# Patient Record
Sex: MALE | Race: WHITE | NOT HISPANIC OR LATINO | Employment: OTHER | URBAN - METROPOLITAN AREA
[De-identification: names, ages, dates, MRNs, and addresses within clinical notes are randomized per-mention and may not be internally consistent; named-entity substitution may affect disease eponyms.]

---

## 2018-02-22 ENCOUNTER — OFFICE VISIT (OUTPATIENT)
Dept: URGENT CARE | Facility: CLINIC | Age: 58
End: 2018-02-22
Payer: COMMERCIAL

## 2018-02-22 VITALS
BODY MASS INDEX: 34.46 KG/M2 | HEART RATE: 85 BPM | DIASTOLIC BLOOD PRESSURE: 98 MMHG | OXYGEN SATURATION: 99 % | TEMPERATURE: 97.5 F | WEIGHT: 260 LBS | SYSTOLIC BLOOD PRESSURE: 130 MMHG | RESPIRATION RATE: 16 BRPM | HEIGHT: 73 IN

## 2018-02-22 DIAGNOSIS — S05.02XA ABRASION OF LEFT CORNEA, INITIAL ENCOUNTER: ICD-10-CM

## 2018-02-22 PROCEDURE — 99203 OFFICE O/P NEW LOW 30 MIN: CPT | Performed by: PHYSICIAN ASSISTANT

## 2018-02-22 RX ORDER — MOXIFLOXACIN 5 MG/ML
1 SOLUTION/ DROPS OPHTHALMIC 3 TIMES DAILY
Qty: 1 BOTTLE | Refills: 0 | Status: SHIPPED | OUTPATIENT
Start: 2018-02-22 | End: 2018-03-01

## 2018-02-22 ASSESSMENT — ENCOUNTER SYMPTOMS
COUGH: 0
DOUBLE VISION: 0
HEADACHES: 0
EYE REDNESS: 1
PALPITATIONS: 0
EYE PAIN: 1
SORE THROAT: 0
CHILLS: 0
PHOTOPHOBIA: 0
SHORTNESS OF BREATH: 0
EYE DISCHARGE: 0
FOREIGN BODY SENSATION: 1
FEVER: 0
BLURRED VISION: 0

## 2018-02-22 ASSESSMENT — PATIENT HEALTH QUESTIONNAIRE - PHQ9: CLINICAL INTERPRETATION OF PHQ2 SCORE: 0

## 2018-02-22 NOTE — PROGRESS NOTES
"Subjective:      Martir Hoyos Jr. is a 57 y.o. male who presents with Eye Problem (left eye, grainy feeling inside the eye, was moving some ventalation yesterday, red and white part was swollen )            Eye Problem    The left eye is affected. This is a new problem. The current episode started yesterday. The problem occurs constantly. The problem has been unchanged. The injury mechanism was a foreign body. The pain is mild. There is no known exposure to pink eye. He does not wear contacts. Associated symptoms include eye redness and a foreign body sensation. Pertinent negatives include no blurred vision, eye discharge, double vision, fever or photophobia. He has tried commercial eye wash for the symptoms. The treatment provided mild relief.       Review of Systems   Constitutional: Negative for chills, fever and malaise/fatigue.   HENT: Negative for congestion, ear pain and sore throat.    Eyes: Positive for pain and redness. Negative for blurred vision, double vision, photophobia and discharge.   Respiratory: Negative for cough and shortness of breath.    Cardiovascular: Negative for chest pain and palpitations.   Skin: Negative for rash.   Neurological: Negative for headaches.   All other systems reviewed and are negative.    PMH:  has no past medical history on file.  MEDS:   Current Outpatient Prescriptions:   •  moxifloxacin (VIGAMOX) 0.5 % Solution, Place 1 Drop in both eyes 3 times a day for 7 days., Disp: 1 Bottle, Rfl: 0  ALLERGIES: Not on File  SURGHX: History reviewed. No pertinent surgical history.  SOCHX:  reports that he has never smoked. He has never used smokeless tobacco. He reports that he drinks alcohol. He reports that he does not use drugs.  FH: Family history was reviewed, no pertinent findings to report  Medications, Allergies, and current problem list reviewed today in Epic       Objective:     /98   Pulse 85   Temp 36.4 °C (97.5 °F)   Resp 16   Ht 1.854 m (6' 1\")   Wt " 117.9 kg (260 lb)   SpO2 99%   BMI 34.30 kg/m²      Physical Exam   Constitutional: He is oriented to person, place, and time. He appears well-developed and well-nourished. He is active.  Non-toxic appearance. He does not have a sickly appearance. He does not appear ill. No distress.   HENT:   Head: Normocephalic and atraumatic.   Right Ear: Hearing, tympanic membrane, external ear and ear canal normal.   Left Ear: Hearing, tympanic membrane, external ear and ear canal normal.   Nose: Nose normal.   Mouth/Throat: Uvula is midline, oropharynx is clear and moist and mucous membranes are normal. No oropharyngeal exudate.   Eyes: EOM and lids are normal. Pupils are equal, round, and reactive to light. Lids are everted and swept, no foreign bodies found. Left conjunctiva is injected.       Neck: Normal range of motion and full passive range of motion without pain. Neck supple.   Cardiovascular: Normal rate, regular rhythm and normal heart sounds.  Exam reveals no gallop and no friction rub.    No murmur heard.  Pulmonary/Chest: Effort normal and breath sounds normal. No respiratory distress. He has no decreased breath sounds. He has no wheezes. He has no rales. He exhibits no tenderness.   Musculoskeletal: Normal range of motion. He exhibits no edema, tenderness or deformity.   Neurological: He is alert and oriented to person, place, and time.   Skin: Skin is warm, dry and intact.   Psychiatric: He has a normal mood and affect. His speech is normal and behavior is normal. Judgment and thought content normal.   Vitals reviewed.              Assessment/Plan:     1. Abrasion of left cornea, initial encounter    - moxifloxacin (VIGAMOX) 0.5 % Solution; Place 1 Drop in both eyes 3 times a day for 7 days.  Dispense: 1 Bottle; Refill: 0    Differential diagnosis, natural history, supportive care discussed. Follow-up with primary care provider within 7-10 days, emergency room precautions discussed.  Patient and/or family  appears understanding of information.

## 2018-02-22 NOTE — PATIENT INSTRUCTIONS
Corneal Abrasion  The cornea is the clear covering at the front and center of the eye. When looking at the colored portion of the eye (iris), you are looking through the cornea. This very thin tissue is made up of many layers. The surface layer is a single layer of cells (corneal epithelium) and is one of the most sensitive tissues in the body. If a scratch or injury causes the corneal epithelium to come off, it is called a corneal abrasion. If the injury extends to the tissues below the epithelium, the condition is called a corneal ulcer.  CAUSES   · Scratches.  · Trauma.  · Foreign body in the eye.  Some people have recurrences of abrasions in the area of the original injury even after it has healed (recurrent erosion syndrome). Recurrent erosion syndrome generally improves and goes away with time.  SYMPTOMS   · Eye pain.  · Difficulty or inability to keep the injured eye open.  · The eye becomes very sensitive to light.  · Recurrent erosions tend to happen suddenly, first thing in the morning, usually after waking up and opening the eye.  DIAGNOSIS   Your health care provider can diagnose a corneal abrasion during an eye exam. Dye is usually placed in the eye using a drop or a small paper strip moistened by your tears. When the eye is examined with a special light, the abrasion shows up clearly because of the dye.  TREATMENT   · Small abrasions may be treated with antibiotic drops or ointment alone.  · A pressure patch may be put over the eye. If this is done, follow your doctor's instructions for when to remove the patch. Do not drive or use machines while the eye patch is on. Judging distances is hard to do with a patch on.  If the abrasion becomes infected and spreads to the deeper tissues of the cornea, a corneal ulcer can result. This is serious because it can cause corneal scarring. Corneal scars interfere with light passing through the cornea and cause a loss of vision in the involved eye.  HOME CARE  INSTRUCTIONS  · Use medicine or ointment as directed. Only take over-the-counter or prescription medicines for pain, discomfort, or fever as directed by your health care provider.  · Do not drive or operate machinery if your eye is patched. Your ability to  distances is impaired.  · If your health care provider has given you a follow-up appointment, it is very important to keep that appointment. Not keeping the appointment could result in a severe eye infection or permanent loss of vision. If there is any problem keeping the appointment, let your health care provider know.  SEEK MEDICAL CARE IF:   · You have pain, light sensitivity, and a scratchy feeling in one eye or both eyes.  · Your pressure patch keeps loosening up, and you can blink your eye under the patch after treatment.  · Any kind of discharge develops from the eye after treatment or if the lids stick together in the morning.  · You have the same symptoms in the morning as you did with the original abrasion days, weeks, or months after the abrasion healed.  MAKE SURE YOU:   · Understand these instructions.  · Will watch your condition.  · Will get help right away if you are not doing well or get worse.     This information is not intended to replace advice given to you by your health care provider. Make sure you discuss any questions you have with your health care provider.     Document Released: 12/15/2001 Document Revised: 12/23/2014 Document Reviewed: 08/25/2014  [employer] Interactive Patient Education ©2016 [employer]

## 2018-03-15 ENCOUNTER — HOSPITAL ENCOUNTER (OUTPATIENT)
Dept: RADIOLOGY | Facility: MEDICAL CENTER | Age: 58
End: 2018-03-15
Attending: FAMILY MEDICINE
Payer: COMMERCIAL

## 2018-03-15 DIAGNOSIS — M25.559 ARTHRALGIA OF HIP, UNSPECIFIED LATERALITY: ICD-10-CM

## 2018-03-15 PROCEDURE — 72100 X-RAY EXAM L-S SPINE 2/3 VWS: CPT

## 2023-10-23 PROBLEM — M16.11 OSTEOARTHRITIS OF RIGHT HIP: Status: ACTIVE | Noted: 2023-10-23

## 2025-02-19 ENCOUNTER — TELEPHONE (OUTPATIENT)
Dept: VASCULAR SURGERY | Facility: MEDICAL CENTER | Age: 65
End: 2025-02-19

## 2025-02-21 NOTE — PROGRESS NOTES
REFERRING PHYSICIAN: Charles Lawler MD.     CONSULTING PHYSICIAN: Bertram Buckley DO     CHIEF COMPLAINT: Fatigue    HISTORY OF PRESENT ILLNESS: The patient is a 64 y.o. male with past medical history of coronary artery disease, hyperlipidemia, hypertension, and BPH who presents to clinic. He complains of shortness of breath and fatigue. He denies chest pain, orthopnea, PND, dizziness, and syncope. He walks daily.     PAST MEDICAL HISTORY:   Active Ambulatory Problems     Diagnosis Date Noted    Osteoarthritis of right hip 10/23/2023     Resolved Ambulatory Problems     Diagnosis Date Noted    No Resolved Ambulatory Problems     No Additional Past Medical History       PAST SURGICAL HISTORY:   Past Surgical History:   Procedure Laterality Date    VT TOTAL HIP ARTHROPLASTY Right 12/19/2023    Procedure: RIGHT TOTAL HIP ARTHROPLASTY ANTERIOR APPROACH;  Surgeon: Pancho Castro M.D.;  Location: [facility];  Service: Orthopedics        ALLERGIES: Not on File     CURRENT MEDICATIONS:   Current Outpatient Medications:     celecoxib (CELEBREX) 200 MG Cap, TAKE 1 CAPSULE WITH FOOD ORALLY ONCE A DAY AS NEEDED 30 DAYS, Disp: , Rfl:     hydroCHLOROthiazide 25 MG Tab, Take 25 mg by mouth every day., Disp: , Rfl:     irbesartan (AVAPRO) 300 MG Tab, Take 1 Tablet by mouth every day., Disp: , Rfl:     metoprolol SR (TOPROL XL) 25 MG TABLET SR 24 HR, Take 25 mg by mouth every day. FOR 30 DAYS, Disp: , Rfl:     tamsulosin (FLOMAX) 0.4 MG capsule, TAKE 2 CAPSULES BY MOUTH EVERY DAY IN THE MORNING 90 30, Disp: , Rfl:     rosuvastatin (CRESTOR) 20 MG Tab, Take 20 mg by mouth every evening., Disp: , Rfl:     FAMILY HISTORY: No family history on file.     SOCIAL HISTORY:   Social History     Socioeconomic History    Marital status:      Spouse name: Not on file    Number of children: Not on file    Years of education: Not on file    Highest education level: Not on file   Occupational History     Not on file   Tobacco Use    Smoking status: Never    Smokeless tobacco: Never   Substance and Sexual Activity    Alcohol use: Yes     Comment: social    Drug use: No    Sexual activity: Not on file   Other Topics Concern    Not on file   Social History Narrative    Not on file     Social Drivers of Health     Financial Resource Strain: Not on file   Food Insecurity: Not on file   Transportation Needs: Not on file   Physical Activity: Not on file   Stress: Not on file   Social Connections: Not on file   Intimate Partner Violence: Not on file   Housing Stability: Not on file       REVIEW OF SYSTEMS:  Review of Systems   Constitutional:  Positive for malaise/fatigue. Negative for chills, diaphoresis, fever and weight loss.   HENT:  Negative for congestion, ear pain, hearing loss, nosebleeds, sore throat and tinnitus.    Eyes:  Negative for blurred vision, double vision, pain and discharge.   Respiratory:  Positive for shortness of breath. Negative for cough, hemoptysis, sputum production, wheezing and stridor.    Cardiovascular:  Negative for chest pain, palpitations, orthopnea and leg swelling.   Gastrointestinal:  Negative for abdominal pain, constipation, heartburn, melena, nausea and vomiting.   Genitourinary:  Negative for dysuria, flank pain and hematuria.   Musculoskeletal:  Negative for joint pain, myalgias and neck pain.   Skin:  Negative for itching and rash.   Neurological:  Negative for dizziness, speech change, focal weakness, seizures, weakness and headaches.   Endo/Heme/Allergies:  Negative for environmental allergies and polydipsia. Does not bruise/bleed easily.   Psychiatric/Behavioral:  Negative for depression, hallucinations, substance abuse and suicidal ideas.          PHYSICAL EXAMINATION:    /70 (BP Location: Left arm, Patient Position: Sitting, BP Cuff Size: Adult)   Pulse 67   Temp 36.5 °C (97.7 °F) (Temporal)   SpO2 97%      Physical Exam  Constitutional:       General: He is not  "in acute distress.  HENT:      Head: Normocephalic and atraumatic.      Nose: Nose normal.   Eyes:      Conjunctiva/sclera: Conjunctivae normal.      Pupils: Pupils are equal, round, and reactive to light.   Neck:      Vascular: No JVD.      Trachea: No tracheal deviation.   Cardiovascular:      Rate and Rhythm: Normal rate and regular rhythm.      Heart sounds: No murmur heard.  Pulmonary:      Effort: Pulmonary effort is normal. No respiratory distress.      Breath sounds: Normal breath sounds. No stridor.   Abdominal:      General: Bowel sounds are normal. There is no distension.      Palpations: Abdomen is soft.      Tenderness: There is no abdominal tenderness.   Musculoskeletal:         General: No tenderness. Normal range of motion.      Cervical back: Normal range of motion and neck supple.   Skin:     General: Skin is warm and dry.   Neurological:      Mental Status: He is alert and oriented to person, place, and time.      Coordination: Coordination normal.      Gait: Gait is intact.   Psychiatric:         Mood and Affect: Mood and affect normal.         Cognition and Memory: Memory normal.         LABS REVIEWED:  Lab Results   Component Value Date/Time    SODIUM 139 12/12/2024 10:14 AM    POTASSIUM 3.9 12/12/2024 10:14 AM    CHLORIDE 107 12/12/2024 10:14 AM    CO2 22.0 12/12/2024 10:14 AM    GLUCOSE 107 12/12/2024 10:14 AM    BUN 19.0 12/12/2024 10:14 AM    CREATININE 0.90 12/12/2024 10:14 AM    BUNCREATRAT 21.1 (H) 12/12/2024 10:14 AM      No results found for: \"PROTHROMBTM\", \"INR\"   Lab Results   Component Value Date/Time    WBC 7.10 12/12/2024 10:14 AM    RBC 5.26 12/12/2024 10:14 AM    HEMOGLOBIN 16.1 12/12/2024 10:14 AM    HEMATOCRIT 47.0 12/12/2024 10:14 AM    MCV 89.4 12/12/2024 10:14 AM    MCH 30.7 12/12/2024 10:14 AM    MCHC 34.4 12/12/2024 10:14 AM    MPV 9.0 12/12/2024 10:14 AM    NEUTSPOLYS 72.3 12/12/2024 10:14 AM    LYMPHOCYTES 18.6 (L) 12/12/2024 10:14 AM    MONOCYTES 6.5 12/12/2024 10:14 " AM    EOSINOPHILS 1.6 12/12/2024 10:14 AM    BASOPHILS 1.0 12/12/2024 10:14 AM        IMAGING REVIEWED AND INTERPRETED:    ECHOCARDIOGRAM   11/27/24 [facility]  Impression  Normal left ventricular systolic function. The left ventruclar ejection fraction is visually estimated to be 65%  The right ventricle is normal in size and systolic function  No significant valvular abnormalities.     CARDIAC CATHETERIZATION   12/12/24 [facility]  Impression     1. 100% occluded proximal LAD with reconstitution in the distal portion from left-to-left collateralization from a distal circumflex artery     2. Nonobstructive disease seen in the circumflex artery and obtuse marginal artery moderate plaque formation noted     3. Proximal RCA disease in the range of 35-40%         IMPRESSION:  Occlusion of LAD, HTN, HLD      PLAN:  I recommend CABG x 1.     The procedure, its risks, benefits, potential complications and alternative treatments were discussed with the patient in detail including the risks should he decide not to undergo my recommended treatment. All of his questions were answered to his satisfaction and he is willing to proceed with the operation. The risks include death, stroke, infection: to include a rare bacterial infection related to the use of the heart/lung machine, uma-operative myocardial infarction, dysrhythmias, diaphragmatic paralysis, chest wall paresthesia, tracheostomy, kidney or other organ failure, possible return to the operating room for bleeding, bleeding requiring transfusion with its attendant risks including AIDS or hepatitis, dehiscence of surgical incisions, respiratory complications including the need for prolonged ventilator support, Protamine or other drug reaction, peripheral neuropathy, loss of limb, and miscount of surgical items. The operative mortality risk is approximately 0.5%. The STS mortality risk score is 0.2% and the morbidity and mortality risk score is 4%. The scores were discussed with  patient.    Thank you for this very challenging consultation and participation in the patient’s care.  I will keep you apprised of all future developments.      The operation will be scheduled once the patient and his family make arrangements to their respective schedules.     Sincerely,     Bertram Buckley DO.

## 2025-02-24 ENCOUNTER — HOSPITAL ENCOUNTER (OUTPATIENT)
Dept: RADIOLOGY | Facility: MEDICAL CENTER | Age: 65
End: 2025-02-24

## 2025-02-24 ENCOUNTER — HOSPITAL ENCOUNTER (OUTPATIENT)
Dept: RADIOLOGY | Facility: MEDICAL CENTER | Age: 65
End: 2025-02-24
Attending: INTERNAL MEDICINE

## 2025-03-06 ENCOUNTER — OFFICE VISIT (OUTPATIENT)
Facility: MEDICAL CENTER | Age: 65
End: 2025-03-06
Payer: COMMERCIAL

## 2025-03-06 VITALS
HEART RATE: 67 BPM | OXYGEN SATURATION: 97 % | SYSTOLIC BLOOD PRESSURE: 102 MMHG | DIASTOLIC BLOOD PRESSURE: 70 MMHG | TEMPERATURE: 97.7 F

## 2025-03-06 DIAGNOSIS — I25.84 CORONARY ARTERY DISEASE DUE TO CALCIFIED CORONARY LESION: ICD-10-CM

## 2025-03-06 DIAGNOSIS — I25.10 CORONARY ARTERY DISEASE DUE TO CALCIFIED CORONARY LESION: ICD-10-CM

## 2025-03-06 PROCEDURE — 99205 OFFICE O/P NEW HI 60 MIN: CPT | Performed by: THORACIC SURGERY (CARDIOTHORACIC VASCULAR SURGERY)

## 2025-03-06 PROCEDURE — 3074F SYST BP LT 130 MM HG: CPT | Performed by: THORACIC SURGERY (CARDIOTHORACIC VASCULAR SURGERY)

## 2025-03-06 PROCEDURE — 3078F DIAST BP <80 MM HG: CPT | Performed by: THORACIC SURGERY (CARDIOTHORACIC VASCULAR SURGERY)

## 2025-03-06 RX ORDER — METOPROLOL SUCCINATE 25 MG/1
25 TABLET, EXTENDED RELEASE ORAL
COMMUNITY

## 2025-03-06 RX ORDER — HYDROCHLOROTHIAZIDE 25 MG/1
25 TABLET ORAL DAILY
COMMUNITY
Start: 2023-07-01

## 2025-03-06 RX ORDER — ROSUVASTATIN CALCIUM 40 MG/1
40 TABLET, COATED ORAL DAILY
COMMUNITY
Start: 2023-12-08 | End: 2024-03-07

## 2025-03-06 RX ORDER — ROSUVASTATIN CALCIUM 20 MG/1
20 TABLET, COATED ORAL EVERY EVENING
COMMUNITY

## 2025-03-06 RX ORDER — IRBESARTAN 300 MG/1
1 TABLET ORAL DAILY
COMMUNITY
Start: 2023-07-01

## 2025-03-06 RX ORDER — TAMSULOSIN HYDROCHLORIDE 0.4 MG/1
CAPSULE ORAL NIGHTLY
COMMUNITY

## 2025-03-06 RX ORDER — CELECOXIB 200 MG/1
CAPSULE ORAL
COMMUNITY

## 2025-03-06 ASSESSMENT — ENCOUNTER SYMPTOMS
HALLUCINATIONS: 0
DEPRESSION: 0
HEADACHES: 0
POLYDIPSIA: 0
EYE PAIN: 0
BRUISES/BLEEDS EASILY: 0
VOMITING: 0
BLURRED VISION: 0
NAUSEA: 0
DIAPHORESIS: 0
SEIZURES: 0
HEARTBURN: 0
SPUTUM PRODUCTION: 0
ABDOMINAL PAIN: 0
CHILLS: 0
HEMOPTYSIS: 0
COUGH: 0
STRIDOR: 0
DOUBLE VISION: 0
ORTHOPNEA: 0
SPEECH CHANGE: 0
WHEEZING: 0
DIZZINESS: 0
NECK PAIN: 0
FLANK PAIN: 0
FEVER: 0
EYE DISCHARGE: 0
FOCAL WEAKNESS: 0
CONSTIPATION: 0
WEIGHT LOSS: 0
WEAKNESS: 0
PALPITATIONS: 0
SORE THROAT: 0
SHORTNESS OF BREATH: 1
MYALGIAS: 0

## 2025-03-06 ASSESSMENT — LIFESTYLE VARIABLES: SUBSTANCE_ABUSE: 0

## 2025-03-10 ENCOUNTER — HOSPITAL ENCOUNTER (OUTPATIENT)
Dept: RADIOLOGY | Facility: MEDICAL CENTER | Age: 65
End: 2025-03-10
Attending: NURSE PRACTITIONER
Payer: COMMERCIAL

## 2025-03-10 DIAGNOSIS — I25.84 CORONARY ARTERY DISEASE DUE TO CALCIFIED CORONARY LESION: ICD-10-CM

## 2025-03-10 DIAGNOSIS — I25.10 CORONARY ARTERY DISEASE DUE TO CALCIFIED CORONARY LESION: ICD-10-CM

## 2025-03-10 PROCEDURE — 93970 EXTREMITY STUDY: CPT

## 2025-03-10 PROCEDURE — 93880 EXTRACRANIAL BILAT STUDY: CPT

## 2025-03-11 NOTE — PROGRESS NOTES
Problem: Prehabilitation    Goal: optimize identified modifiable risk factors prior to cardiac surgery.     Intervention: Screening and interventions for the following  risk factors with educational materials provided if indicated  and patient demonstrates readiness to participate.  Dentition, malnutrition, CAD and dietary cholesterol,  obesity, alcohol and tobacco abuse, illegal drug use,  recent eye surgery/procedures, A1C > 7.5 for pharmacotherapy referral,  and social support system for post discharge planning.    Additionally, home exercise regimen initiation or continuation  (if appropriate), pre surgery weight restriction for aneurysm patients,  and teaching of and participation of inspiratory muscle training   via incentive spirometer (provided).    Review of post-surgical physical limitations, upcoming  appointments & testing, ordered diagnostics, and medication review  to identify anticoagulants, antihypertensives, HF and DM medications  that need cessation prior to surgery.    The surgery instructions sheet,MAP, and education booklet   was provided for patient to read and review.    Surgical CHG wipes were also provided with instructions on use.    DENTITION:  Routine dental appointment prior to surgery  Is not indicated for CABG procedure.    he was not encouraged to get a dental   cleaning as he does get regular  dental cleaning and denies current dental   infection or issues that should be  addressed prior to surgery.    RECENT EYE SURGERY:   Patient denies recent eye surgery or procedures in in the last  6 months, surgeon was not notified.    INCENTIVE SPIROMETRY:  Discussed importance of incentive spirometry (IS) use,  20 times a day AT MINIMUM but more often if possible to  optimize cardio-pulmonary function prior to surgery.  They were instructed to allow a 5 minute rest in  between uses to achieve max IS volume.  Education with return demonstration performed.   Coaching was provided, patient is  effective.    Prehabilitation IS baseline is 4250.    HOME EXERCISE REGIMEN:  We discussed importance of preventing deconditioning and  muscle wasting in the time preceding surgery as this will occur  post surgery.    > 60 % left main disease present? NO  EF-- 65%  Active sub lingual nitro use? NO  he is appropriate for initiation  of a home exercise regimen.    he is minimally physically active; current  exercise tolerance/level is moderate due to no  symptoms with current 15 min of walking activity.    he was educated to increase his his/her current exercise regimen of walking 15 min daily to 30 minutes daily.     he was educated  that if chest pain or SOB occurs patient is to stop  immediately and if symptoms do not  resolve after stopping to call 911.    he was also encouraged to practice sit to stand  from a chair with one arm to assist or no arm assistance  12 times a day to strengthen quadriceps, improve balance,  and to mentally prepare for this restriction    Patient is not an aneurysm patient and a weight restriction is not indicated.    CAD:  Patient does have known CAD; education on  cholesterol, diet, and the heart are indicated.    OBESITY:  Patient's BMI is over 30.  Education regarding portion  Sizing and diet are indicated.    MALNUTRITION:  Malnutrition screening tool (MST) shows he is  not at risk with a score of 0.  (0-1 not at risk; greater or equal to 2 is at risk).    Given MST score, functional capacity,   and no reported muscle loss, it was not  recommended they increase their protein  intake to 1.2 to 2.5 gram/kg/day.    SMOKING:  Patient denies current smoking history.  Smoking risks  and cessation education not indicated and was not provided.    ALCOHOL ABUSE:  Patient denies alcohol abuse.  Alcohol risks and cessation  education not indicated and was not provided.    ILLEGAL DRUG USE:  Patient denies illicit drug use.  Illicit drug use risks  and cessation education not indicated and was  not provided.    SOCIAL SUPPORT SYSTEM FOR DISCHARGE NEEDS:    Patient does have family,  his wife Fifi to stay for  2 days post discharge to assist with ADL's. No barriers  to hospital discharge anticipated.    PSYCHOLOGICAL PREPARATION:  We discussed the basics of physical limitation post op to include:               No driving for 4 weeks               No lifting, pushing or pulling > 10 lbs for 6 weeks  Sternal precautions to include moving  Within the tube and safe mobility in and  out of bed and the chair.    ANTIBIOTIC STEWARDSHIP:  MRSA swab will be collected by Anish during pre-admit testing.    MEDICATION AN UPCOMING APPOINTMENTS REVIEW:  Current medications were reviewed that may need  specific stop dates prior to surgery. The patient was instructed   to stop the following medications after the indicated date.    Irebartan to stop 2 full days prior to surgery; last dose 4/5    Vascular scheduling sheet was provided and explained.    Walk test Times: unable to collect    A phone appointment for pre op education was made  for 4/4 between 7-9 am to review all provided education materials.'

## 2025-03-13 ENCOUNTER — APPOINTMENT (OUTPATIENT)
Dept: ADMISSIONS | Facility: MEDICAL CENTER | Age: 65
End: 2025-03-13
Attending: THORACIC SURGERY (CARDIOTHORACIC VASCULAR SURGERY)
Payer: COMMERCIAL

## 2025-03-20 ENCOUNTER — PRE-ADMISSION TESTING (OUTPATIENT)
Dept: ADMISSIONS | Facility: MEDICAL CENTER | Age: 65
End: 2025-03-20
Attending: THORACIC SURGERY (CARDIOTHORACIC VASCULAR SURGERY)
Payer: COMMERCIAL

## 2025-03-20 RX ORDER — SODIUM PHOSPHATE,MONO-DIBASIC 19G-7G/118
500 ENEMA (ML) RECTAL 2 TIMES DAILY
Status: ON HOLD | COMMUNITY
End: 2025-04-12

## 2025-03-20 NOTE — PREADMIT AVS NOTE
Current Medications                                          Medication instructions per cardiology

## 2025-04-04 ENCOUNTER — TELEPHONE (OUTPATIENT)
Facility: MEDICAL CENTER | Age: 65
End: 2025-04-04
Payer: COMMERCIAL

## 2025-04-04 NOTE — TELEPHONE ENCOUNTER
Patient was reminded that CABG surgery with  Dr. Del Rosario is on 4/8 at 07:30 in the morning. he  is are aware check in is at 05:15 am.    PAT date and time was reviewed with patient and  verified it is within 72 hours of surgery.    Vascular studies and procedures: Carotids, vein mapping  were scheduled, completed,  and reviewed by myself for concerning  results did not need escalation to the TOMAS/MD.    he did not need a dental check/work and was compliant.    Baseline IS was 4250. he has been compliant   with the IS. Volume has improved.    he was prescribed a walking regimen or  told to continue he current regimen and   he has been compliant.   he has been practicing sit to stand.    he was reminded that Avapro needs to stop after 4/5.    he was told that Flomax medication (s) are okay to  take the morning of surgery prior to check in.    The [facility] wipes instructions were reviewed and understood.    he was reminded no food after midnight.    Call time 10 minutes

## 2025-04-07 ENCOUNTER — PRE-ADMISSION TESTING (OUTPATIENT)
Dept: ADMISSIONS | Facility: MEDICAL CENTER | Age: 65
DRG: 236 | End: 2025-04-07
Attending: THORACIC SURGERY (CARDIOTHORACIC VASCULAR SURGERY)
Payer: COMMERCIAL

## 2025-04-07 ENCOUNTER — HOSPITAL ENCOUNTER (OUTPATIENT)
Dept: RADIOLOGY | Facility: MEDICAL CENTER | Age: 65
DRG: 236 | End: 2025-04-07
Attending: THORACIC SURGERY (CARDIOTHORACIC VASCULAR SURGERY) | Admitting: THORACIC SURGERY (CARDIOTHORACIC VASCULAR SURGERY)
Payer: COMMERCIAL

## 2025-04-07 DIAGNOSIS — Z01.810 PRE-OPERATIVE CARDIOVASCULAR EXAMINATION: ICD-10-CM

## 2025-04-07 DIAGNOSIS — Z01.811 PRE-OPERATIVE RESPIRATORY EXAMINATION: ICD-10-CM

## 2025-04-07 DIAGNOSIS — Z01.812 PRE-OPERATIVE LABORATORY EXAMINATION: ICD-10-CM

## 2025-04-07 LAB
ABO GROUP BLD: NORMAL
ALBUMIN SERPL BCP-MCNC: 4.1 G/DL (ref 3.2–4.9)
ALBUMIN/GLOB SERPL: 1.5 G/DL
ALP SERPL-CCNC: 66 U/L (ref 30–99)
ALT SERPL-CCNC: 48 U/L (ref 2–50)
AMPHET UR QL SCN: NEGATIVE
ANION GAP SERPL CALC-SCNC: 10 MMOL/L (ref 7–16)
APPEARANCE UR: CLEAR
APTT PPP: 26 SEC (ref 24.7–36)
AST SERPL-CCNC: 36 U/L (ref 12–45)
BARBITURATES UR QL SCN: NEGATIVE
BASOPHILS # BLD AUTO: 1 % (ref 0–1.8)
BASOPHILS # BLD: 0.06 K/UL (ref 0–0.12)
BENZODIAZ UR QL SCN: NEGATIVE
BILIRUB SERPL-MCNC: 0.6 MG/DL (ref 0.1–1.5)
BILIRUB UR QL STRIP.AUTO: NEGATIVE
BLD GP AB SCN SERPL QL: NORMAL
BUN SERPL-MCNC: 14 MG/DL (ref 8–22)
BZE UR QL SCN: NEGATIVE
CALCIUM ALBUM COR SERPL-MCNC: 8.9 MG/DL (ref 8.5–10.5)
CALCIUM SERPL-MCNC: 9 MG/DL (ref 8.5–10.5)
CANNABINOIDS UR QL SCN: NEGATIVE
CHLORIDE SERPL-SCNC: 105 MMOL/L (ref 96–112)
CO2 SERPL-SCNC: 22 MMOL/L (ref 20–33)
COLOR UR: YELLOW
CREAT SERPL-MCNC: 0.95 MG/DL (ref 0.5–1.4)
EKG IMPRESSION: NORMAL
EOSINOPHIL # BLD AUTO: 0.25 K/UL (ref 0–0.51)
EOSINOPHIL NFR BLD: 4 % (ref 0–6.9)
ERYTHROCYTE [DISTWIDTH] IN BLOOD BY AUTOMATED COUNT: 41.6 FL (ref 35.9–50)
EST. AVERAGE GLUCOSE BLD GHB EST-MCNC: 114 MG/DL
FENTANYL UR QL: NEGATIVE
GFR SERPLBLD CREATININE-BSD FMLA CKD-EPI: 90 ML/MIN/1.73 M 2
GLOBULIN SER CALC-MCNC: 2.8 G/DL (ref 1.9–3.5)
GLUCOSE SERPL-MCNC: 106 MG/DL (ref 65–99)
GLUCOSE UR STRIP.AUTO-MCNC: NEGATIVE MG/DL
HBA1C MFR BLD: 5.6 % (ref 4–5.6)
HCT VFR BLD AUTO: 47.7 % (ref 42–52)
HGB BLD-MCNC: 15.9 G/DL (ref 14–18)
IMM GRANULOCYTES # BLD AUTO: 0.01 K/UL (ref 0–0.11)
IMM GRANULOCYTES NFR BLD AUTO: 0.2 % (ref 0–0.9)
INR PPP: 0.99 (ref 0.87–1.13)
KETONES UR STRIP.AUTO-MCNC: NEGATIVE MG/DL
LEUKOCYTE ESTERASE UR QL STRIP.AUTO: NEGATIVE
LYMPHOCYTES # BLD AUTO: 1.44 K/UL (ref 1–4.8)
LYMPHOCYTES NFR BLD: 23.3 % (ref 22–41)
MCH RBC QN AUTO: 29.3 PG (ref 27–33)
MCHC RBC AUTO-ENTMCNC: 33.3 G/DL (ref 32.3–36.5)
MCV RBC AUTO: 87.8 FL (ref 81.4–97.8)
METHADONE UR QL SCN: NEGATIVE
MICRO URNS: NORMAL
MONOCYTES # BLD AUTO: 0.49 K/UL (ref 0–0.85)
MONOCYTES NFR BLD AUTO: 7.9 % (ref 0–13.4)
NEUTROPHILS # BLD AUTO: 3.93 K/UL (ref 1.82–7.42)
NEUTROPHILS NFR BLD: 63.6 % (ref 44–72)
NITRITE UR QL STRIP.AUTO: NEGATIVE
NRBC # BLD AUTO: 0 K/UL
NRBC BLD-RTO: 0 /100 WBC (ref 0–0.2)
OPIATES UR QL SCN: NEGATIVE
OXYCODONE UR QL SCN: NEGATIVE
PCP UR QL SCN: NEGATIVE
PH UR STRIP.AUTO: 6 [PH] (ref 5–8)
PLATELET # BLD AUTO: 284 K/UL (ref 164–446)
PMV BLD AUTO: 10.5 FL (ref 9–12.9)
POTASSIUM SERPL-SCNC: 4.4 MMOL/L (ref 3.6–5.5)
PROPOXYPH UR QL SCN: NEGATIVE
PROT SERPL-MCNC: 6.9 G/DL (ref 6–8.2)
PROT UR QL STRIP: NEGATIVE MG/DL
PROTHROMBIN TIME: 13.1 SEC (ref 12–14.6)
RBC # BLD AUTO: 5.43 M/UL (ref 4.7–6.1)
RBC UR QL AUTO: NEGATIVE
RH BLD: NORMAL
SCCMEC + MECA PNL NOSE NAA+PROBE: NEGATIVE
SCCMEC + MECA PNL NOSE NAA+PROBE: NEGATIVE
SODIUM SERPL-SCNC: 137 MMOL/L (ref 135–145)
SP GR UR STRIP.AUTO: 1.02
UROBILINOGEN UR STRIP.AUTO-MCNC: 1 EU/DL
WBC # BLD AUTO: 6.2 K/UL (ref 4.8–10.8)

## 2025-04-07 PROCEDURE — 80307 DRUG TEST PRSMV CHEM ANLYZR: CPT

## 2025-04-07 PROCEDURE — 85025 COMPLETE CBC W/AUTO DIFF WBC: CPT

## 2025-04-07 PROCEDURE — 81003 URINALYSIS AUTO W/O SCOPE: CPT

## 2025-04-07 PROCEDURE — 86850 RBC ANTIBODY SCREEN: CPT

## 2025-04-07 PROCEDURE — 87640 STAPH A DNA AMP PROBE: CPT

## 2025-04-07 PROCEDURE — 83036 HEMOGLOBIN GLYCOSYLATED A1C: CPT

## 2025-04-07 PROCEDURE — 87641 MR-STAPH DNA AMP PROBE: CPT

## 2025-04-07 PROCEDURE — 85730 THROMBOPLASTIN TIME PARTIAL: CPT

## 2025-04-07 PROCEDURE — 86900 BLOOD TYPING SEROLOGIC ABO: CPT

## 2025-04-07 PROCEDURE — 93010 ELECTROCARDIOGRAM REPORT: CPT | Performed by: INTERNAL MEDICINE

## 2025-04-07 PROCEDURE — 93005 ELECTROCARDIOGRAM TRACING: CPT | Mod: TC

## 2025-04-07 PROCEDURE — 80053 COMPREHEN METABOLIC PANEL: CPT

## 2025-04-07 PROCEDURE — 71046 X-RAY EXAM CHEST 2 VIEWS: CPT

## 2025-04-07 PROCEDURE — 86901 BLOOD TYPING SEROLOGIC RH(D): CPT

## 2025-04-07 PROCEDURE — 36415 COLL VENOUS BLD VENIPUNCTURE: CPT

## 2025-04-07 PROCEDURE — 85610 PROTHROMBIN TIME: CPT

## 2025-04-08 ENCOUNTER — PATIENT OUTREACH (OUTPATIENT)
Dept: SCHEDULING | Facility: IMAGING CENTER | Age: 65
End: 2025-04-08

## 2025-04-08 ENCOUNTER — HOSPITAL ENCOUNTER (INPATIENT)
Facility: MEDICAL CENTER | Age: 65
LOS: 4 days | DRG: 236 | End: 2025-04-12
Attending: THORACIC SURGERY (CARDIOTHORACIC VASCULAR SURGERY) | Admitting: THORACIC SURGERY (CARDIOTHORACIC VASCULAR SURGERY)
Payer: COMMERCIAL

## 2025-04-08 ENCOUNTER — APPOINTMENT (OUTPATIENT)
Dept: CARDIOLOGY | Facility: MEDICAL CENTER | Age: 65
DRG: 236 | End: 2025-04-08
Attending: THORACIC SURGERY (CARDIOTHORACIC VASCULAR SURGERY)
Payer: COMMERCIAL

## 2025-04-08 ENCOUNTER — ANESTHESIA EVENT (OUTPATIENT)
Dept: SURGERY | Facility: MEDICAL CENTER | Age: 65
DRG: 236 | End: 2025-04-08
Payer: COMMERCIAL

## 2025-04-08 ENCOUNTER — APPOINTMENT (OUTPATIENT)
Dept: RADIOLOGY | Facility: MEDICAL CENTER | Age: 65
DRG: 236 | End: 2025-04-08
Attending: THORACIC SURGERY (CARDIOTHORACIC VASCULAR SURGERY)
Payer: COMMERCIAL

## 2025-04-08 ENCOUNTER — ANESTHESIA (OUTPATIENT)
Dept: SURGERY | Facility: MEDICAL CENTER | Age: 65
DRG: 236 | End: 2025-04-08
Payer: COMMERCIAL

## 2025-04-08 DIAGNOSIS — Z95.1 S/P CABG X 1: Primary | ICD-10-CM

## 2025-04-08 DIAGNOSIS — G89.18 ACUTE POST-OPERATIVE PAIN: ICD-10-CM

## 2025-04-08 PROBLEM — Z99.11 ENCOUNTER FOR WEANING FROM VENTILATOR (HCC): Status: ACTIVE | Noted: 2025-04-08

## 2025-04-08 PROBLEM — I25.10 CORONARY ARTERY DISEASE: Status: ACTIVE | Noted: 2025-04-08

## 2025-04-08 PROBLEM — N40.0 BPH (BENIGN PROSTATIC HYPERPLASIA): Status: ACTIVE | Noted: 2025-04-08

## 2025-04-08 PROBLEM — I10 PRIMARY HYPERTENSION: Status: ACTIVE | Noted: 2025-04-08

## 2025-04-08 LAB
ABO + RH BLD: NORMAL
ACT BLD: 112 SEC (ref 74–137)
ACT BLD: 124 SEC (ref 74–137)
ACT BLD: 383 SEC (ref 74–137)
ACT BLD: 481 SEC (ref 74–137)
APTT PPP: 27.4 SEC (ref 24.7–36)
ARTERIAL PATENCY WRIST A: ABNORMAL
BASE EXCESS BLDA CALC-SCNC: -3 MMOL/L (ref -4–3)
BASE EXCESS BLDA CALC-SCNC: -4 MMOL/L (ref -4–3)
BASE EXCESS BLDA CALC-SCNC: -7 MMOL/L (ref -4–3)
BODY TEMPERATURE: ABNORMAL DEGREES
CA-I BLD ISE-SCNC: 0.97 MMOL/L (ref 1.1–1.3)
CA-I BLD ISE-SCNC: 1.09 MMOL/L (ref 1.1–1.3)
CA-I BLD ISE-SCNC: 1.14 MMOL/L (ref 1.1–1.3)
CA-I BLD ISE-SCNC: 1.17 MMOL/L (ref 1.1–1.3)
CA-I BLD ISE-SCNC: 1.18 MMOL/L (ref 1.1–1.3)
CO2 BLDA-SCNC: 18 MMOL/L (ref 32–48)
CO2 BLDA-SCNC: 21 MMOL/L (ref 32–48)
CO2 BLDA-SCNC: 21 MMOL/L (ref 32–48)
CO2 BLDA-SCNC: 23 MMOL/L (ref 32–48)
CO2 BLDA-SCNC: 23 MMOL/L (ref 32–48)
CO2 BLDA-SCNC: 24 MMOL/L (ref 32–48)
CO2 BLDA-SCNC: 24 MMOL/L (ref 32–48)
DELSYS IDSYS: ABNORMAL
END TIDAL CARBON DIOXIDE IECO2: 21 MMHG
END TIDAL CARBON DIOXIDE IECO2: 23 MMHG
END TIDAL CARBON DIOXIDE IECO2: 26 MMHG
GLUCOSE BLD STRIP.AUTO-MCNC: 127 MG/DL (ref 65–99)
GLUCOSE BLD STRIP.AUTO-MCNC: 128 MG/DL (ref 65–99)
GLUCOSE BLD STRIP.AUTO-MCNC: 134 MG/DL (ref 65–99)
GLUCOSE BLD STRIP.AUTO-MCNC: 143 MG/DL (ref 65–99)
GLUCOSE BLD STRIP.AUTO-MCNC: 147 MG/DL (ref 65–99)
GLUCOSE BLD STRIP.AUTO-MCNC: 150 MG/DL (ref 65–99)
GLUCOSE BLD STRIP.AUTO-MCNC: 157 MG/DL (ref 65–99)
GLUCOSE BLD STRIP.AUTO-MCNC: 157 MG/DL (ref 65–99)
GLUCOSE BLD STRIP.AUTO-MCNC: 160 MG/DL (ref 65–99)
GLUCOSE BLD STRIP.AUTO-MCNC: 162 MG/DL (ref 65–99)
GLUCOSE BLD STRIP.AUTO-MCNC: 170 MG/DL (ref 65–99)
GLUCOSE BLD STRIP.AUTO-MCNC: 185 MG/DL (ref 65–99)
GLUCOSE BLD STRIP.AUTO-MCNC: 204 MG/DL (ref 65–99)
HCO3 BLDA-SCNC: 17 MMOL/L (ref 21–28)
HCO3 BLDA-SCNC: 19.6 MMOL/L (ref 21–28)
HCO3 BLDA-SCNC: 19.8 MMOL/L (ref 21–28)
HCO3 BLDA-SCNC: 21.7 MMOL/L (ref 21–28)
HCO3 BLDA-SCNC: 22.1 MMOL/L (ref 21–28)
HCO3 BLDA-SCNC: 22.3 MMOL/L (ref 21–28)
HCO3 BLDA-SCNC: 22.4 MMOL/L (ref 21–28)
HCT VFR BLD AUTO: 41.4 % (ref 42–52)
HCT VFR BLD CALC: 31 % (ref 42–52)
HCT VFR BLD CALC: 38 % (ref 42–52)
HCT VFR BLD CALC: 42 % (ref 42–52)
HCT VFR BLD CALC: 42 % (ref 42–52)
HGB BLD-MCNC: 10.5 G/DL (ref 14–18)
HGB BLD-MCNC: 12.9 G/DL (ref 14–18)
HGB BLD-MCNC: 14.2 G/DL (ref 14–18)
HGB BLD-MCNC: 14.3 G/DL (ref 14–18)
HGB BLD-MCNC: 14.3 G/DL (ref 14–18)
HOROWITZ INDEX BLDA+IHG-RTO: 133 MM[HG]
HOROWITZ INDEX BLDA+IHG-RTO: 178 MM[HG]
HOROWITZ INDEX BLDA+IHG-RTO: 220 MM[HG]
INR PPP: 1.2 (ref 0.87–1.13)
LACTATE BLD-SCNC: 1.8 MMOL/L (ref 0.5–2)
LACTATE BLD-SCNC: 2.5 MMOL/L (ref 0.5–2)
MAGNESIUM SERPL-MCNC: 2.5 MG/DL (ref 1.5–2.5)
MODE IMODE: ABNORMAL
O2/TOTAL GAS SETTING VFR VENT: 100 %
O2/TOTAL GAS SETTING VFR VENT: 40 %
O2/TOTAL GAS SETTING VFR VENT: 60 %
PCO2 BLDA: 20 MMHG (ref 32–48)
PCO2 BLDA: 27.7 MMHG (ref 32–48)
PCO2 BLDA: 35.6 MMHG (ref 32–48)
PCO2 BLDA: 39.3 MMHG (ref 32–48)
PCO2 BLDA: 40.7 MMHG (ref 32–48)
PCO2 BLDA: 42.8 MMHG (ref 32–48)
PCO2 BLDA: 44.6 MMHG (ref 32–48)
PCO2 TEMP ADJ BLDA: 20.1 MMHG (ref 32–48)
PCO2 TEMP ADJ BLDA: 26.7 MMHG (ref 32–48)
PCO2 TEMP ADJ BLDA: 34.2 MMHG (ref 32–48)
PCO2 TEMP ADJ BLDA: 36.5 MMHG (ref 32–48)
PCO2 TEMP ADJ BLDA: 39.5 MMHG (ref 32–48)
PCO2 TEMP ADJ BLDA: 41.7 MMHG (ref 32–48)
PCO2 TEMP ADJ BLDA: 42.3 MMHG (ref 32–48)
PEEP END EXPIRATORY PRESSURE IPEEP: 8 CMH20
PERCENT MINUTE VOLUME IPMV: 160
PERCENT MINUTE VOLUME IPMV: 160
PH BLDA: 7.25 [PH] (ref 7.35–7.45)
PH BLDA: 7.32 [PH] (ref 7.35–7.45)
PH BLDA: 7.34 [PH] (ref 7.35–7.45)
PH BLDA: 7.36 [PH] (ref 7.35–7.45)
PH BLDA: 7.39 [PH] (ref 7.35–7.45)
PH BLDA: 7.46 [PH] (ref 7.35–7.45)
PH BLDA: 7.54 [PH] (ref 7.35–7.45)
PH TEMP ADJ BLDA: 7.27 [PH] (ref 7.35–7.45)
PH TEMP ADJ BLDA: 7.33 [PH] (ref 7.35–7.45)
PH TEMP ADJ BLDA: 7.35 [PH] (ref 7.35–7.45)
PH TEMP ADJ BLDA: 7.39 [PH] (ref 7.35–7.45)
PH TEMP ADJ BLDA: 7.41 [PH] (ref 7.35–7.45)
PH TEMP ADJ BLDA: 7.47 [PH] (ref 7.35–7.45)
PH TEMP ADJ BLDA: 7.54 [PH] (ref 7.35–7.45)
PLATELET # BLD AUTO: 228 K/UL (ref 164–446)
PO2 BLDA: 178 MMHG (ref 83–108)
PO2 BLDA: 193 MMHG (ref 83–108)
PO2 BLDA: 282 MMHG (ref 83–108)
PO2 BLDA: 302 MMHG (ref 83–108)
PO2 BLDA: 340 MMHG (ref 83–108)
PO2 BLDA: 80 MMHG (ref 83–108)
PO2 BLDA: 88 MMHG (ref 83–108)
PO2 TEMP ADJ BLDA: 173 MMHG (ref 83–108)
PO2 TEMP ADJ BLDA: 189 MMHG (ref 83–108)
PO2 TEMP ADJ BLDA: 279 MMHG (ref 83–108)
PO2 TEMP ADJ BLDA: 297 MMHG (ref 83–108)
PO2 TEMP ADJ BLDA: 332 MMHG (ref 83–108)
PO2 TEMP ADJ BLDA: 75 MMHG (ref 83–108)
PO2 TEMP ADJ BLDA: 89 MMHG (ref 83–108)
POTASSIUM BLD-SCNC: 3.7 MMOL/L (ref 3.6–5.5)
POTASSIUM BLD-SCNC: 4 MMOL/L (ref 3.6–5.5)
POTASSIUM BLD-SCNC: 4.1 MMOL/L (ref 3.6–5.5)
POTASSIUM BLD-SCNC: 4.3 MMOL/L (ref 3.6–5.5)
POTASSIUM BLD-SCNC: 4.5 MMOL/L (ref 3.6–5.5)
POTASSIUM SERPL-SCNC: 3.8 MMOL/L (ref 3.6–5.5)
POTASSIUM SERPL-SCNC: 4.2 MMOL/L (ref 3.6–5.5)
POTASSIUM SERPL-SCNC: 4.7 MMOL/L (ref 3.6–5.5)
PRESSURE SUPPORT SETTING VENT: 5 CM[H2O]
PROTHROMBIN TIME: 15.3 SEC (ref 12–14.6)
SAO2 % BLDA: 100 % (ref 93–99)
SAO2 % BLDA: 97 % (ref 93–99)
SAO2 % BLDA: 98 % (ref 93–99)
SODIUM BLD-SCNC: 136 MMOL/L (ref 135–145)
SODIUM BLD-SCNC: 138 MMOL/L (ref 135–145)
SODIUM BLD-SCNC: 139 MMOL/L (ref 135–145)
SODIUM BLD-SCNC: 140 MMOL/L (ref 135–145)
SODIUM BLD-SCNC: 143 MMOL/L (ref 135–145)
SPECIMEN DRAWN FROM PATIENT: ABNORMAL

## 2025-04-08 PROCEDURE — 160042 HCHG SURGERY MINUTES - EA ADDL 1 MIN LEVEL 5: Performed by: THORACIC SURGERY (CARDIOTHORACIC VASCULAR SURGERY)

## 2025-04-08 PROCEDURE — 85049 AUTOMATED PLATELET COUNT: CPT

## 2025-04-08 PROCEDURE — 85610 PROTHROMBIN TIME: CPT

## 2025-04-08 PROCEDURE — 85018 HEMOGLOBIN: CPT

## 2025-04-08 PROCEDURE — C1729 CATH, DRAINAGE: HCPCS | Performed by: THORACIC SURGERY (CARDIOTHORACIC VASCULAR SURGERY)

## 2025-04-08 PROCEDURE — B24BZZ4 ULTRASONOGRAPHY OF HEART WITH AORTA, TRANSESOPHAGEAL: ICD-10-PCS | Performed by: ANESTHESIOLOGY

## 2025-04-08 PROCEDURE — 94150 VITAL CAPACITY TEST: CPT

## 2025-04-08 PROCEDURE — 83735 ASSAY OF MAGNESIUM: CPT

## 2025-04-08 PROCEDURE — 82962 GLUCOSE BLOOD TEST: CPT | Mod: 91

## 2025-04-08 PROCEDURE — 700105 HCHG RX REV CODE 258: Performed by: THORACIC SURGERY (CARDIOTHORACIC VASCULAR SURGERY)

## 2025-04-08 PROCEDURE — 700102 HCHG RX REV CODE 250 W/ 637 OVERRIDE(OP)

## 2025-04-08 PROCEDURE — 700111 HCHG RX REV CODE 636 W/ 250 OVERRIDE (IP): Performed by: THORACIC SURGERY (CARDIOTHORACIC VASCULAR SURGERY)

## 2025-04-08 PROCEDURE — C1894 INTRO/SHEATH, NON-LASER: HCPCS | Performed by: THORACIC SURGERY (CARDIOTHORACIC VASCULAR SURGERY)

## 2025-04-08 PROCEDURE — 700111 HCHG RX REV CODE 636 W/ 250 OVERRIDE (IP): Mod: JZ

## 2025-04-08 PROCEDURE — P9047 ALBUMIN (HUMAN), 25%, 50ML: HCPCS | Mod: JZ

## 2025-04-08 PROCEDURE — 700101 HCHG RX REV CODE 250

## 2025-04-08 PROCEDURE — 160009 HCHG ANES TIME/MIN: Performed by: THORACIC SURGERY (CARDIOTHORACIC VASCULAR SURGERY)

## 2025-04-08 PROCEDURE — 85347 COAGULATION TIME ACTIVATED: CPT | Mod: 91

## 2025-04-08 PROCEDURE — 700101 HCHG RX REV CODE 250: Performed by: ANESTHESIOLOGY

## 2025-04-08 PROCEDURE — 02100Z9 BYPASS CORONARY ARTERY, ONE ARTERY FROM LEFT INTERNAL MAMMARY, OPEN APPROACH: ICD-10-PCS | Performed by: ANESTHESIOLOGY

## 2025-04-08 PROCEDURE — 700101 HCHG RX REV CODE 250: Performed by: THORACIC SURGERY (CARDIOTHORACIC VASCULAR SURGERY)

## 2025-04-08 PROCEDURE — 36415 COLL VENOUS BLD VENIPUNCTURE: CPT

## 2025-04-08 PROCEDURE — 700102 HCHG RX REV CODE 250 W/ 637 OVERRIDE(OP): Performed by: THORACIC SURGERY (CARDIOTHORACIC VASCULAR SURGERY)

## 2025-04-08 PROCEDURE — 160031 HCHG SURGERY MINUTES - 1ST 30 MINS LEVEL 5: Performed by: THORACIC SURGERY (CARDIOTHORACIC VASCULAR SURGERY)

## 2025-04-08 PROCEDURE — 83605 ASSAY OF LACTIC ACID: CPT

## 2025-04-08 PROCEDURE — 503001 HCHG PERFUSION: Performed by: THORACIC SURGERY (CARDIOTHORACIC VASCULAR SURGERY)

## 2025-04-08 PROCEDURE — 71045 X-RAY EXAM CHEST 1 VIEW: CPT

## 2025-04-08 PROCEDURE — A9270 NON-COVERED ITEM OR SERVICE: HCPCS | Performed by: THORACIC SURGERY (CARDIOTHORACIC VASCULAR SURGERY)

## 2025-04-08 PROCEDURE — 33533 CABG ARTERIAL SINGLE: CPT | Mod: AS

## 2025-04-08 PROCEDURE — 85014 HEMATOCRIT: CPT | Mod: 91

## 2025-04-08 PROCEDURE — 700111 HCHG RX REV CODE 636 W/ 250 OVERRIDE (IP)

## 2025-04-08 PROCEDURE — A9270 NON-COVERED ITEM OR SERVICE: HCPCS

## 2025-04-08 PROCEDURE — 5A2204Z RESTORATION OF CARDIAC RHYTHM, SINGLE: ICD-10-PCS | Performed by: ANESTHESIOLOGY

## 2025-04-08 PROCEDURE — C1713 ANCHOR/SCREW BN/BN,TIS/BN: HCPCS | Performed by: THORACIC SURGERY (CARDIOTHORACIC VASCULAR SURGERY)

## 2025-04-08 PROCEDURE — 94002 VENT MGMT INPAT INIT DAY: CPT

## 2025-04-08 PROCEDURE — 160015 HCHG STAT PREOP MINUTES: Performed by: THORACIC SURGERY (CARDIOTHORACIC VASCULAR SURGERY)

## 2025-04-08 PROCEDURE — C9248 INJ, CLEVIDIPINE BUTYRATE: HCPCS | Performed by: ANESTHESIOLOGY

## 2025-04-08 PROCEDURE — C1898 LEAD, PMKR, OTHER THAN TRANS: HCPCS | Performed by: THORACIC SURGERY (CARDIOTHORACIC VASCULAR SURGERY)

## 2025-04-08 PROCEDURE — C1751 CATH, INF, PER/CENT/MIDLINE: HCPCS | Performed by: THORACIC SURGERY (CARDIOTHORACIC VASCULAR SURGERY)

## 2025-04-08 PROCEDURE — 33533 CABG ARTERIAL SINGLE: CPT | Performed by: THORACIC SURGERY (CARDIOTHORACIC VASCULAR SURGERY)

## 2025-04-08 PROCEDURE — 86900 BLOOD TYPING SEROLOGIC ABO: CPT

## 2025-04-08 PROCEDURE — 84295 ASSAY OF SERUM SODIUM: CPT | Mod: 91

## 2025-04-08 PROCEDURE — 99291 CRITICAL CARE FIRST HOUR: CPT | Performed by: EMERGENCY MEDICINE

## 2025-04-08 PROCEDURE — C9248 INJ, CLEVIDIPINE BUTYRATE: HCPCS | Mod: JZ

## 2025-04-08 PROCEDURE — 700105 HCHG RX REV CODE 258: Performed by: ANESTHESIOLOGY

## 2025-04-08 PROCEDURE — 93325 DOPPLER ECHO COLOR FLOW MAPG: CPT

## 2025-04-08 PROCEDURE — 86901 BLOOD TYPING SEROLOGIC RH(D): CPT

## 2025-04-08 PROCEDURE — 84132 ASSAY OF SERUM POTASSIUM: CPT | Mod: 91

## 2025-04-08 PROCEDURE — 160048 HCHG OR STATISTICAL LEVEL 1-5: Performed by: THORACIC SURGERY (CARDIOTHORACIC VASCULAR SURGERY)

## 2025-04-08 PROCEDURE — 82803 BLOOD GASES ANY COMBINATION: CPT | Mod: 91

## 2025-04-08 PROCEDURE — 770022 HCHG ROOM/CARE - ICU (200)

## 2025-04-08 PROCEDURE — 85730 THROMBOPLASTIN TIME PARTIAL: CPT

## 2025-04-08 PROCEDURE — 5A1221Z PERFORMANCE OF CARDIAC OUTPUT, CONTINUOUS: ICD-10-PCS | Performed by: ANESTHESIOLOGY

## 2025-04-08 PROCEDURE — 700105 HCHG RX REV CODE 258

## 2025-04-08 PROCEDURE — A9270 NON-COVERED ITEM OR SERVICE: HCPCS | Performed by: EMERGENCY MEDICINE

## 2025-04-08 PROCEDURE — 93005 ELECTROCARDIOGRAM TRACING: CPT | Mod: TC

## 2025-04-08 PROCEDURE — 94799 UNLISTED PULMONARY SVC/PX: CPT

## 2025-04-08 PROCEDURE — 82330 ASSAY OF CALCIUM: CPT | Mod: 91

## 2025-04-08 PROCEDURE — 700102 HCHG RX REV CODE 250 W/ 637 OVERRIDE(OP): Performed by: EMERGENCY MEDICINE

## 2025-04-08 PROCEDURE — 700111 HCHG RX REV CODE 636 W/ 250 OVERRIDE (IP): Performed by: ANESTHESIOLOGY

## 2025-04-08 DEVICE — DEVICE CLOSURE STERNAL ZIPFIX: Type: IMPLANTABLE DEVICE | Status: FUNCTIONAL

## 2025-04-08 RX ORDER — MAGNESIUM SULFATE 1 G/100ML
1 INJECTION INTRAVENOUS DAILY
Status: COMPLETED | OUTPATIENT
Start: 2025-04-08 | End: 2025-04-10

## 2025-04-08 RX ORDER — TAMSULOSIN HYDROCHLORIDE 0.4 MG/1
0.4 CAPSULE ORAL NIGHTLY
Status: DISCONTINUED | OUTPATIENT
Start: 2025-04-09 | End: 2025-04-08

## 2025-04-08 RX ORDER — PROTAMINE SULFATE 10 MG/ML
INJECTION, SOLUTION INTRAVENOUS PRN
Status: DISCONTINUED | OUTPATIENT
Start: 2025-04-08 | End: 2025-04-08 | Stop reason: SURG

## 2025-04-08 RX ORDER — ACETAMINOPHEN 325 MG/1
650 TABLET ORAL EVERY 4 HOURS PRN
Status: DISCONTINUED | OUTPATIENT
Start: 2025-04-08 | End: 2025-04-12 | Stop reason: HOSPADM

## 2025-04-08 RX ORDER — MIDAZOLAM HYDROCHLORIDE 1 MG/ML
2 INJECTION INTRAMUSCULAR; INTRAVENOUS
Status: DISCONTINUED | OUTPATIENT
Start: 2025-04-08 | End: 2025-04-10

## 2025-04-08 RX ORDER — DIPHENHYDRAMINE HCL 25 MG
25 TABLET ORAL
Status: DISCONTINUED | OUTPATIENT
Start: 2025-04-08 | End: 2025-04-12 | Stop reason: HOSPADM

## 2025-04-08 RX ORDER — ALUMINA, MAGNESIA, AND SIMETHICONE 2400; 2400; 240 MG/30ML; MG/30ML; MG/30ML
30 SUSPENSION ORAL EVERY 4 HOURS PRN
Status: DISCONTINUED | OUTPATIENT
Start: 2025-04-08 | End: 2025-04-12 | Stop reason: HOSPADM

## 2025-04-08 RX ORDER — SODIUM CHLORIDE 9 MG/ML
INJECTION, SOLUTION INTRAVENOUS CONTINUOUS
Status: DISCONTINUED | OUTPATIENT
Start: 2025-04-08 | End: 2025-04-10

## 2025-04-08 RX ORDER — POTASSIUM CHLORIDE 7.45 MG/ML
10 INJECTION INTRAVENOUS ONCE
Status: COMPLETED | OUTPATIENT
Start: 2025-04-09 | End: 2025-04-09

## 2025-04-08 RX ORDER — METOPROLOL TARTRATE 25 MG/1
12.5 TABLET, FILM COATED ORAL 2 TIMES DAILY
Status: DISPENSED | OUTPATIENT
Start: 2025-04-09 | End: 2025-04-10

## 2025-04-08 RX ORDER — OMEPRAZOLE 20 MG/1
20 CAPSULE, DELAYED RELEASE ORAL DAILY
Status: DISCONTINUED | OUTPATIENT
Start: 2025-04-09 | End: 2025-04-12 | Stop reason: HOSPADM

## 2025-04-08 RX ORDER — EPINEPHRINE HCL IN 0.9 % NACL 4MG/250ML
0-.5 PLASTIC BAG, INJECTION (ML) INTRAVENOUS CONTINUOUS
Status: DISCONTINUED | OUTPATIENT
Start: 2025-04-08 | End: 2025-04-08

## 2025-04-08 RX ORDER — ASPIRIN 81 MG/1
81 TABLET ORAL DAILY
Status: DISCONTINUED | OUTPATIENT
Start: 2025-04-09 | End: 2025-04-12 | Stop reason: HOSPADM

## 2025-04-08 RX ORDER — ACETAMINOPHEN 500 MG
1000 TABLET ORAL EVERY 6 HOURS
Status: DISCONTINUED | OUTPATIENT
Start: 2025-04-08 | End: 2025-04-12 | Stop reason: HOSPADM

## 2025-04-08 RX ORDER — SODIUM CHLORIDE, SODIUM GLUCONATE, SODIUM ACETATE, POTASSIUM CHLORIDE AND MAGNESIUM CHLORIDE 526; 502; 368; 37; 30 MG/100ML; MG/100ML; MG/100ML; MG/100ML; MG/100ML
INJECTION, SOLUTION INTRAVENOUS PRN
Status: DISCONTINUED | OUTPATIENT
Start: 2025-04-08 | End: 2025-04-12 | Stop reason: HOSPADM

## 2025-04-08 RX ORDER — POLYETHYLENE GLYCOL 3350 17 G/17G
1 POWDER, FOR SOLUTION ORAL DAILY
Status: DISCONTINUED | OUTPATIENT
Start: 2025-04-09 | End: 2025-04-12 | Stop reason: HOSPADM

## 2025-04-08 RX ORDER — MORPHINE SULFATE 4 MG/ML
4 INJECTION INTRAVENOUS
Status: DISCONTINUED | OUTPATIENT
Start: 2025-04-08 | End: 2025-04-10

## 2025-04-08 RX ORDER — SODIUM CHLORIDE, SODIUM LACTATE, POTASSIUM CHLORIDE, CALCIUM CHLORIDE 600; 310; 30; 20 MG/100ML; MG/100ML; MG/100ML; MG/100ML
INJECTION, SOLUTION INTRAVENOUS
Status: DISCONTINUED | OUTPATIENT
Start: 2025-04-08 | End: 2025-04-08 | Stop reason: SURG

## 2025-04-08 RX ORDER — CEFAZOLIN SODIUM 1 G/3ML
INJECTION, POWDER, FOR SOLUTION INTRAMUSCULAR; INTRAVENOUS PRN
Status: DISCONTINUED | OUTPATIENT
Start: 2025-04-08 | End: 2025-04-08

## 2025-04-08 RX ORDER — SODIUM CHLORIDE 9 MG/ML
INJECTION, SOLUTION INTRAVENOUS
Status: DISCONTINUED | OUTPATIENT
Start: 2025-04-08 | End: 2025-04-08 | Stop reason: SURG

## 2025-04-08 RX ORDER — POTASSIUM CHLORIDE 7.45 MG/ML
10 INJECTION INTRAVENOUS ONCE
Status: COMPLETED | OUTPATIENT
Start: 2025-04-08 | End: 2025-04-08

## 2025-04-08 RX ORDER — ACETAMINOPHEN 650 MG/1
650 SUPPOSITORY RECTAL EVERY 4 HOURS PRN
Status: DISCONTINUED | OUTPATIENT
Start: 2025-04-08 | End: 2025-04-12 | Stop reason: HOSPADM

## 2025-04-08 RX ORDER — AMOXICILLIN 250 MG
2 CAPSULE ORAL 2 TIMES DAILY
Status: DISCONTINUED | OUTPATIENT
Start: 2025-04-08 | End: 2025-04-12 | Stop reason: HOSPADM

## 2025-04-08 RX ORDER — PROCHLORPERAZINE EDISYLATE 5 MG/ML
10 INJECTION INTRAMUSCULAR; INTRAVENOUS EVERY 6 HOURS PRN
Status: DISCONTINUED | OUTPATIENT
Start: 2025-04-08 | End: 2025-04-12 | Stop reason: HOSPADM

## 2025-04-08 RX ORDER — INSULIN LISPRO 100 [IU]/ML
0-14 INJECTION, SOLUTION INTRAVENOUS; SUBCUTANEOUS
Status: DISCONTINUED | OUTPATIENT
Start: 2025-04-08 | End: 2025-04-09

## 2025-04-08 RX ORDER — DEXTROSE MONOHYDRATE 25 G/50ML
12.5-25 INJECTION, SOLUTION INTRAVENOUS PRN
Status: DISCONTINUED | OUTPATIENT
Start: 2025-04-08 | End: 2025-04-09

## 2025-04-08 RX ORDER — ETHYL ALCOHOL 62 %
1 SWAB, MEDICATED TOPICAL
Status: DISCONTINUED | OUTPATIENT
Start: 2025-04-08 | End: 2025-04-12 | Stop reason: HOSPADM

## 2025-04-08 RX ORDER — METHADONE HYDROCHLORIDE 10 MG/ML
20 INJECTION, SOLUTION INTRAMUSCULAR; INTRAVENOUS; SUBCUTANEOUS ONCE
Status: COMPLETED | OUTPATIENT
Start: 2025-04-08 | End: 2025-04-08

## 2025-04-08 RX ORDER — ROSUVASTATIN CALCIUM 20 MG/1
20 TABLET, COATED ORAL EVERY EVENING
Status: DISCONTINUED | OUTPATIENT
Start: 2025-04-08 | End: 2025-04-12 | Stop reason: HOSPADM

## 2025-04-08 RX ORDER — MIDAZOLAM HYDROCHLORIDE 1 MG/ML
INJECTION INTRAMUSCULAR; INTRAVENOUS PRN
Status: DISCONTINUED | OUTPATIENT
Start: 2025-04-08 | End: 2025-04-08 | Stop reason: SURG

## 2025-04-08 RX ORDER — TAMSULOSIN HYDROCHLORIDE 0.4 MG/1
0.4 CAPSULE ORAL NIGHTLY
Status: DISCONTINUED | OUTPATIENT
Start: 2025-04-08 | End: 2025-04-12 | Stop reason: HOSPADM

## 2025-04-08 RX ORDER — OXYCODONE HYDROCHLORIDE 5 MG/1
5 TABLET ORAL
Status: DISCONTINUED | OUTPATIENT
Start: 2025-04-08 | End: 2025-04-12 | Stop reason: HOSPADM

## 2025-04-08 RX ORDER — NITROGLYCERIN 20 MG/100ML
0-100 INJECTION INTRAVENOUS CONTINUOUS
Status: DISCONTINUED | OUTPATIENT
Start: 2025-04-08 | End: 2025-04-09

## 2025-04-08 RX ORDER — BISACODYL 10 MG
10 SUPPOSITORY, RECTAL RECTAL
Status: DISCONTINUED | OUTPATIENT
Start: 2025-04-08 | End: 2025-04-12 | Stop reason: HOSPADM

## 2025-04-08 RX ORDER — ACETAMINOPHEN 500 MG
1000 TABLET ORAL ONCE
Status: COMPLETED | OUTPATIENT
Start: 2025-04-08 | End: 2025-04-08

## 2025-04-08 RX ORDER — PAPAVERINE HYDROCHLORIDE 30 MG/ML
INJECTION INTRAMUSCULAR; INTRAVENOUS
Status: DISCONTINUED | OUTPATIENT
Start: 2025-04-08 | End: 2025-04-08 | Stop reason: HOSPADM

## 2025-04-08 RX ORDER — DEXMEDETOMIDINE HYDROCHLORIDE 4 UG/ML
0-1.5 INJECTION, SOLUTION INTRAVENOUS CONTINUOUS
Status: DISCONTINUED | OUTPATIENT
Start: 2025-04-08 | End: 2025-04-09

## 2025-04-08 RX ORDER — ROCURONIUM BROMIDE 10 MG/ML
INJECTION, SOLUTION INTRAVENOUS PRN
Status: DISCONTINUED | OUTPATIENT
Start: 2025-04-08 | End: 2025-04-08 | Stop reason: SURG

## 2025-04-08 RX ORDER — DEXMEDETOMIDINE HYDROCHLORIDE 4 UG/ML
0-1.5 INJECTION, SOLUTION INTRAVENOUS CONTINUOUS
Status: DISCONTINUED | OUTPATIENT
Start: 2025-04-08 | End: 2025-04-08

## 2025-04-08 RX ORDER — NOREPINEPHRINE BITARTRATE 0.03 MG/ML
0-1 INJECTION, SOLUTION INTRAVENOUS CONTINUOUS
Status: DISCONTINUED | OUTPATIENT
Start: 2025-04-08 | End: 2025-04-08

## 2025-04-08 RX ORDER — NOREPINEPHRINE BITARTRATE 0.03 MG/ML
0-1 INJECTION, SOLUTION INTRAVENOUS CONTINUOUS
Status: DISCONTINUED | OUTPATIENT
Start: 2025-04-08 | End: 2025-04-09

## 2025-04-08 RX ORDER — HEPARIN SODIUM,PORCINE 1000/ML
VIAL (ML) INJECTION PRN
Status: DISCONTINUED | OUTPATIENT
Start: 2025-04-08 | End: 2025-04-08 | Stop reason: SURG

## 2025-04-08 RX ORDER — ACETAMINOPHEN 500 MG
1000 TABLET ORAL EVERY 6 HOURS PRN
Status: DISCONTINUED | OUTPATIENT
Start: 2025-04-18 | End: 2025-04-12 | Stop reason: HOSPADM

## 2025-04-08 RX ORDER — TRAMADOL HYDROCHLORIDE 50 MG/1
50 TABLET ORAL EVERY 4 HOURS PRN
Status: DISCONTINUED | OUTPATIENT
Start: 2025-04-08 | End: 2025-04-12 | Stop reason: HOSPADM

## 2025-04-08 RX ORDER — METOPROLOL TARTRATE 25 MG/1
25 TABLET, FILM COATED ORAL 2 TIMES DAILY
Status: DISCONTINUED | OUTPATIENT
Start: 2025-04-10 | End: 2025-04-12 | Stop reason: HOSPADM

## 2025-04-08 RX ORDER — OXYCODONE HYDROCHLORIDE 10 MG/1
10 TABLET ORAL
Status: DISCONTINUED | OUTPATIENT
Start: 2025-04-08 | End: 2025-04-12 | Stop reason: HOSPADM

## 2025-04-08 RX ORDER — CLOPIDOGREL BISULFATE 75 MG/1
75 TABLET ORAL DAILY
Status: DISCONTINUED | OUTPATIENT
Start: 2025-04-09 | End: 2025-04-12 | Stop reason: HOSPADM

## 2025-04-08 RX ORDER — EPINEPHRINE HCL IN 0.9 % NACL 4MG/250ML
0-.5 PLASTIC BAG, INJECTION (ML) INTRAVENOUS CONTINUOUS
Status: DISCONTINUED | OUTPATIENT
Start: 2025-04-08 | End: 2025-04-09

## 2025-04-08 RX ORDER — AMIODARONE HYDROCHLORIDE 150 MG/3ML
INJECTION, SOLUTION INTRAVENOUS PRN
Status: DISCONTINUED | OUTPATIENT
Start: 2025-04-08 | End: 2025-04-08 | Stop reason: SURG

## 2025-04-08 RX ORDER — INDOMETHACIN 25 MG/1
50 CAPSULE ORAL
Status: DISCONTINUED | OUTPATIENT
Start: 2025-04-08 | End: 2025-04-10

## 2025-04-08 RX ORDER — SODIUM CHLORIDE, SODIUM GLUCONATE, SODIUM ACETATE, POTASSIUM CHLORIDE AND MAGNESIUM CHLORIDE 526; 502; 368; 37; 30 MG/100ML; MG/100ML; MG/100ML; MG/100ML; MG/100ML
INJECTION, SOLUTION INTRAVENOUS
Status: DISCONTINUED | OUTPATIENT
Start: 2025-04-08 | End: 2025-04-08 | Stop reason: SURG

## 2025-04-08 RX ORDER — METOPROLOL TARTRATE 25 MG/1
12.5 TABLET, FILM COATED ORAL ONCE
Status: COMPLETED | OUTPATIENT
Start: 2025-04-08 | End: 2025-04-08

## 2025-04-08 RX ORDER — ONDANSETRON 2 MG/ML
8 INJECTION INTRAMUSCULAR; INTRAVENOUS EVERY 6 HOURS PRN
Status: DISCONTINUED | OUTPATIENT
Start: 2025-04-08 | End: 2025-04-12 | Stop reason: HOSPADM

## 2025-04-08 RX ADMIN — METHADONE HYDROCHLORIDE 10 MG: 10 INJECTION, SOLUTION INTRAMUSCULAR; INTRAVENOUS; SUBCUTANEOUS at 07:31

## 2025-04-08 RX ADMIN — CLEVIPIDINE 2 MG/HR: 0.5 EMULSION INTRAVENOUS at 09:02

## 2025-04-08 RX ADMIN — AMINOCAPROIC ACID 10 G: 250 INJECTION, SOLUTION INTRAVENOUS at 08:02

## 2025-04-08 RX ADMIN — METHADONE HYDROCHLORIDE 10 MG: 10 INJECTION, SOLUTION INTRAMUSCULAR; INTRAVENOUS; SUBCUTANEOUS at 08:17

## 2025-04-08 RX ADMIN — ROCURONIUM BROMIDE 20 MG: 10 INJECTION INTRAVENOUS at 10:24

## 2025-04-08 RX ADMIN — ONDANSETRON 8 MG: 2 INJECTION INTRAMUSCULAR; INTRAVENOUS at 16:16

## 2025-04-08 RX ADMIN — ROCURONIUM BROMIDE 100 MG: 10 INJECTION INTRAVENOUS at 07:34

## 2025-04-08 RX ADMIN — CEFAZOLIN 2 G: 2 INJECTION, POWDER, FOR SOLUTION INTRAMUSCULAR; INTRAVENOUS at 16:23

## 2025-04-08 RX ADMIN — SODIUM CHLORIDE, SODIUM GLUCONATE, SODIUM ACETATE, POTASSIUM CHLORIDE AND MAGNESIUM CHLORIDE: 526; 502; 368; 37; 30 INJECTION, SOLUTION INTRAVENOUS at 15:07

## 2025-04-08 RX ADMIN — MIDAZOLAM HYDROCHLORIDE 2 MG: 1 INJECTION, SOLUTION INTRAMUSCULAR; INTRAVENOUS at 07:31

## 2025-04-08 RX ADMIN — METOPROLOL TARTRATE 12.5 MG: 25 TABLET, FILM COATED ORAL at 06:08

## 2025-04-08 RX ADMIN — MAGNESIUM SULFATE IN DEXTROSE 1 G: 10 INJECTION, SOLUTION INTRAVENOUS at 11:54

## 2025-04-08 RX ADMIN — HEPARIN SODIUM 43000 UNITS: 1000 INJECTION, SOLUTION INTRAVENOUS; SUBCUTANEOUS at 09:13

## 2025-04-08 RX ADMIN — Medication 1 APPLICATOR: at 20:03

## 2025-04-08 RX ADMIN — POTASSIUM CHLORIDE 10 MEQ: 10 INJECTION, SOLUTION INTRAVENOUS at 18:18

## 2025-04-08 RX ADMIN — SODIUM CHLORIDE 2 UNITS/HR: 9 INJECTION, SOLUTION INTRAVENOUS at 12:08

## 2025-04-08 RX ADMIN — PROTAMINE SULFATE 350 MG: 10 INJECTION, SOLUTION INTRAVENOUS at 10:14

## 2025-04-08 RX ADMIN — TRAMADOL HYDROCHLORIDE 50 MG: 50 TABLET, COATED ORAL at 17:34

## 2025-04-08 RX ADMIN — AMINOCAPROIC ACID 2 G/HR: 250 INJECTION, SOLUTION INTRAVENOUS at 08:46

## 2025-04-08 RX ADMIN — ACETAMINOPHEN 1000 MG: 500 TABLET, FILM COATED ORAL at 06:08

## 2025-04-08 RX ADMIN — OXYCODONE HYDROCHLORIDE 10 MG: 10 TABLET ORAL at 15:02

## 2025-04-08 RX ADMIN — SODIUM CHLORIDE: 9 INJECTION, SOLUTION INTRAVENOUS at 07:30

## 2025-04-08 RX ADMIN — AMIODARONE HYDROCHLORIDE 150 MG: 50 INJECTION, SOLUTION INTRAVENOUS at 10:01

## 2025-04-08 RX ADMIN — ROSUVASTATIN CALCIUM 20 MG: 20 TABLET, FILM COATED ORAL at 17:33

## 2025-04-08 RX ADMIN — PROPOFOL 200 MG: 10 INJECTION, EMULSION INTRAVENOUS at 07:34

## 2025-04-08 RX ADMIN — CLEVIPIDINE 2 MG/HR: 0.5 EMULSION INTRAVENOUS at 22:10

## 2025-04-08 RX ADMIN — OXYCODONE HYDROCHLORIDE 10 MG: 10 TABLET ORAL at 20:04

## 2025-04-08 RX ADMIN — DEXMEDETOMIDINE HYDROCHLORIDE 0.4 MCG/KG/HR: 100 INJECTION, SOLUTION INTRAVENOUS at 08:02

## 2025-04-08 RX ADMIN — DEXMEDETOMIDINE HYDROCHLORIDE 0.4 MCG/KG/HR: 100 INJECTION, SOLUTION INTRAVENOUS at 11:38

## 2025-04-08 RX ADMIN — TAMSULOSIN HYDROCHLORIDE 0.4 MG: 0.4 CAPSULE ORAL at 20:03

## 2025-04-08 RX ADMIN — CEFAZOLIN 3 G: 1 INJECTION, POWDER, FOR SOLUTION INTRAMUSCULAR; INTRAVENOUS at 07:51

## 2025-04-08 RX ADMIN — ROCURONIUM BROMIDE 50 MG: 10 INJECTION INTRAVENOUS at 09:11

## 2025-04-08 RX ADMIN — SODIUM CHLORIDE, POTASSIUM CHLORIDE, SODIUM LACTATE AND CALCIUM CHLORIDE: 600; 310; 30; 20 INJECTION, SOLUTION INTRAVENOUS at 07:30

## 2025-04-08 RX ADMIN — NOREPINEPHRINE BITARTRATE 0.01 MCG/KG/MIN: 1 INJECTION, SOLUTION, CONCENTRATE INTRAVENOUS at 11:46

## 2025-04-08 RX ADMIN — SODIUM CHLORIDE, SODIUM GLUCONATE, SODIUM ACETATE, POTASSIUM CHLORIDE AND MAGNESIUM CHLORIDE: 526; 502; 368; 37; 30 INJECTION, SOLUTION INTRAVENOUS at 07:30

## 2025-04-08 RX ADMIN — SENNOSIDES AND DOCUSATE SODIUM 2 TABLET: 50; 8.6 TABLET ORAL at 17:34

## 2025-04-08 RX ADMIN — Medication 1 APPLICATOR: at 12:43

## 2025-04-08 RX ADMIN — CLEVIPIDINE 2 MG/HR: 0.5 EMULSION INTRAVENOUS at 12:35

## 2025-04-08 RX ADMIN — NOREPINEPHRINE BITARTRATE 0.04 MCG/KG/MIN: 1 INJECTION, SOLUTION, CONCENTRATE INTRAVENOUS at 09:04

## 2025-04-08 RX ADMIN — ACETAMINOPHEN 1000 MG: 500 TABLET, FILM COATED ORAL at 17:33

## 2025-04-08 ASSESSMENT — PAIN DESCRIPTION - PAIN TYPE
TYPE: ACUTE PAIN;SURGICAL PAIN

## 2025-04-08 ASSESSMENT — ENCOUNTER SYMPTOMS
WEIGHT LOSS: 0
HEADACHES: 0
BRUISES/BLEEDS EASILY: 0
HEARTBURN: 0
DIAPHORESIS: 0
ORTHOPNEA: 0
ABDOMINAL PAIN: 0
CLAUDICATION: 0
SENSORY CHANGE: 0
SHORTNESS OF BREATH: 1
PND: 0
DEPRESSION: 0
PALPITATIONS: 0
CHILLS: 0
SORE THROAT: 0
VOMITING: 0
FEVER: 0
COUGH: 0
DIZZINESS: 0
SPEECH CHANGE: 0
NAUSEA: 0
WEAKNESS: 0
DOUBLE VISION: 0
BLURRED VISION: 0

## 2025-04-08 ASSESSMENT — FIBROSIS 4 INDEX: FIB4 SCORE: 1.15

## 2025-04-08 ASSESSMENT — COPD QUESTIONNAIRES
DURING THE PAST 4 WEEKS HOW MUCH DID YOU FEEL SHORT OF BREATH: SOME OF THE TIME
HAVE YOU SMOKED AT LEAST 100 CIGARETTES IN YOUR ENTIRE LIFE: NO/DON'T KNOW
COPD SCREENING SCORE: 5
DO YOU EVER COUGH UP ANY MUCUS OR PHLEGM?: YES, A FEW DAYS A WEEK OR MONTH

## 2025-04-08 ASSESSMENT — PULMONARY FUNCTION TESTS: FVC: 2.4

## 2025-04-08 NOTE — ANESTHESIA PROCEDURE NOTES
Airway    Date/Time: 4/8/2025 7:35 AM    Performed by: Stanislav Reilly M.D.  Authorized by: Stanislav Reilly M.D.    Location:  OR  Urgency:  Elective  Difficult Airway: No    Indications for Airway Management:  Anesthesia      Spontaneous Ventilation: absent    Sedation Level:  Deep  Preoxygenated: Yes    Patient Position:  Sniffing  Mask Difficulty Assessment:  0 - not attempted  Final Airway Type:  Endotracheal airway  Final Endotracheal Airway:  ETT  Cuffed: Yes    Technique Used for Successful ETT Placement:  Direct laryngoscopy    Insertion Site:  Oral  Blade Type:  Yoseph  Laryngoscope Blade/Videolaryngoscope Blade Size:  3  ETT Size (mm):  8.5  Measured from:  Teeth  ETT to Teeth (cm):  22  Placement Verified by: auscultation and capnometry    Cormack-Lehane Classification:  Grade I - full view of glottis  Number of Attempts at Approach:  1

## 2025-04-08 NOTE — RESPIRATORY CARE
Extubation    Cuff leak noted Yes  Stridor present No     O2 (LPM): 4 (04/08/25 1403)     Patient toleration Tolerated well  Events/Summary/Plan: pt extubated per [facility] protocol (04/08/25 1403)

## 2025-04-08 NOTE — ASSESSMENT & PLAN NOTE
100% LAD occlusion with collateral reconstitution from the circumflex.   Underwent GRIER-LAD with Dr. Buckley.  Operative course was uneventful.  Post-CPB echo notable for normal BiV function, trace MR. Risk for vasoplegia with pre-op ARB.  Patient arrived to the ICU intubated and accompanied by anesthesia.  Monitor hemodynamics, chest tube output, urine output, and hemoglobin    Titrate clevi for SBP<140  Reintroduce home antihypertensives  Discussed with Dr. Buckley.

## 2025-04-08 NOTE — CONSULTS
Critical Care Consultation    Date of consult: 4/8/2025    Referring Physician  AMMY Jenkins*    Reason for Consultation  CABG    History of Presenting Illness  64 y.o. male with history of CAD, hypertension, hyperlipidemia, BPH, who presented with chest pain, orthopnea, and syncope.  He was found to have 100% LAD occlusion with reconstitution from collaterals from the circumflex on cath in 12/2024.    Underwent LIMA-LAD CABG with Dr. Buckley.    Operative course was uneventful  Pre/post echo notable for normal   Arrives on norepi 0.05  CPB: 31min, XC: 19min  V wires functioning with threshold 2mA  Blood products: n/a     Code Status  Full Code    Review of Systems  ROS    Past Medical History   has a past medical history of Acute nasopharyngitis (3/12/24), Arthritis (03/20/2025), Breath shortness, High cholesterol (03/20/2025), Hypertension (03/20/2025), and Snoring (03/20/2025).    He has no past medical history of Hemorrhagic disorder (HCC) or Hemorrhagic disorder (HCC).    Surgical History   has a past surgical history that includes pr total hip arthroplasty (Right, 12/19/2023).    Family History  family history includes Dementia in his father; Diabetes in his mother; Heart Disease in his brother.    Social History   reports that he has never smoked. He has never used smokeless tobacco. He reports current alcohol use of about 1.2 oz of alcohol per week. He reports that he does not use drugs.    Medications  Home Medications       Reviewed by Viktoria Fitzgerald (Pharmacy Tech) on 04/08/25 at 0700  Med List Status: Complete     Medication Last Dose Status   celecoxib (CELEBREX) 200 MG Cap 4/6/2025 Active   glucosamine Sulfate 500 MG Cap 4/4/2025 Active   hydroCHLOROthiazide 25 MG Tab 4/3/2025 Active   irbesartan (AVAPRO) 300 MG Tab 4/5/2025 Active   metoprolol SR (TOPROL XL) 25 MG TABLET SR 24 HR 4/4/2025 Active   rosuvastatin (CRESTOR) 20 MG Tab 4/4/2025 Active   tamsulosin (FLOMAX) 0.4 MG  capsule 4/7/2025 Active                  Audit from Redirected Encounters    **Home medications have not yet been reviewed for this encounter**       Current Facility-Administered Medications   Medication Dose Route Frequency Provider Last Rate Last Admin    EPINEPHrine (Adrenalin) infusion 4 mg/250 mL (premix)  0-0.5 mcg/kg/min (Ideal) Intravenous Continuous Bertrammargo Buckley, D.O.        norepinephrine (Levophed) 8 mg in 250 mL NS infusion (premix)  0-1 mcg/kg/min (Ideal) Intravenous Continuous [place] Buckley, D.O.        rosuvastatin (Crestor) tablet 20 mg  20 mg Oral Q EVENING Edwin Patel M.D.        [START ON 4/9/2025] tamsulosin (Flomax) capsule 0.4 mg  0.4 mg Oral Nightly Efren Corral P.A.-C.        Respiratory Therapy Consult   Nebulization Continuous RT STEPHANIE Dela CruzCSofia        NS infusion   Intravenous Continuous Efren Corral P.A.-C.        NS infusion   Intravenous Continuous Efren Corral P.A.-C.        ceFAZolin (Ancef) 2 g in  mL IVPB  2 g Intravenous Once Efren Corral P.A.-C.        Nozin nasal  swab  1 Applicator Each Nostril BID Efren Corral P.A.-C.        calcium CHLORIDE 10 % 1,000 mg in dextrose 5% 100 mL IVPB  1,000 mg Intravenous Once PRN CUCA Dela Cruz.KiraCSofia        [START ON 4/9/2025] calcium CHLORIDE 10 % 1,000 mg in dextrose 5% 100 mL IVPB  1,000 mg Intravenous Once PRN STEPHANIE Dela CruzCSofia        magnesium sulfate in D5W IVPB premix 1 g  1 g Intravenous DAILY Efren Corral P.A.-C.        K+ Scale: Goal of 4.5  1 Each Intravenous Q6HRS Efren Corral P.A.-C.        [START ON 4/9/2025] metoprolol tartrate (Lopressor) tablet 12.5 mg  12.5 mg Oral BID Efren Corral P.A.-C.        Followed by    [START ON 4/10/2025] metoprolol tartrate (Lopressor) tablet 25 mg  25 mg Oral BID KELECHI Dela CruzAAYUSH        [START ON 4/9/2025] aspirin EC tablet 81 mg  81 mg Oral DAILY Efren Corral P.A.-C.        [START ON 4/9/2025] clopidogrel (Plavix) tablet 75 mg  75 mg Oral  DAILY Efren Corral P.A.-C.        clevidipine (Cleviprex) IV emulsion  0-21 mg/hr Intravenous Continuous Efren Corral P.A.-C.        nitroglycerin 50 mg in D5W 250 ml infusion  0-100 mcg/min Intravenous Continuous KELECHI Dela CruzA.-C.        Pharmacy Consult Request ...Pain Management Review 1 Each  1 Each Other PHARMACY TO DOSE CUCA Dela Cruz.-CHERRIE.        acetaminophen (Tylenol) tablet 1,000 mg  1,000 mg Oral Q6HRS KELECHI Dela CruzA.-C.        Followed by    [START ON 4/18/2025] acetaminophen (Tylenol) tablet 1,000 mg  1,000 mg Oral Q6HRS PRN KELECHI Dela CruzA.-C.        oxyCODONE immediate-release (Roxicodone) tablet 5 mg  5 mg Oral Q3HRS PRN KELECHI Dela CruzA.-C.        Or    oxyCODONE immediate release (Roxicodone) tablet 10 mg  10 mg Oral Q3HRS PRN MARION Dela Cruz.A.-C.        Or    fentaNYL (Sublimaze) injection 50 mcg  50 mcg Intravenous Q3HRS PRN MARION Dela Cruz.A.-C.        traMADol (Ultram) 50 MG tablet 50 mg  50 mg Oral Q4HRS PRN MARION Dela Cruz.A.-C.        midazolam (Versed) injection 2 mg  2 mg Intravenous Q HOUR PRN KELECHI Dela CruzA.-C.        dexmedetomidine (Precedex) 400 mcg/100mL infusion  0-1.5 mcg/kg/hr (Ideal) Intravenous Continuous KELECHI Dela CruzA.-C.        sodium bicarbonate 8.4 % injection 50 mEq  50 mEq Intravenous Q HOUR PRN MARION Dela Cruz.A.-C.        morphine 4 MG/ML injection 4 mg  4 mg Intravenous Q HOUR PRN KELECHI Dela CruzA.-C.        ondansetron (Zofran) syringe/vial injection 8 mg  8 mg Intravenous Q6HRS PRN MARION Dela Cruz.A.-C.        Or    prochlorperazine (Compazine) injection 10 mg  10 mg Intravenous Q6HRS PRN KELECHI Dela CruzA.-C.        acetaminophen (Tylenol) tablet 650 mg  650 mg Oral Q4HRS PRN Efren Corral P.A.-C.        Or    acetaminophen (Tylenol) suppository 650 mg  650 mg Rectal Q4HRS PRN Efren Corral P.A.-C.        senna-docusate (Pericolace Or Senokot S) 8.6-50 MG per tablet 2 Tablet  2 Tablet Oral BID Efren Corral P.A.-C.        And    [START ON  4/9/2025] polyethylene glycol/lytes (Miralax) Packet 1 Packet  1 Packet Oral DAILY Efren Corral P.A.-C.        And    [START ON 4/10/2025] magnesium hydroxide (Milk Of Magnesia) suspension 30 mL  30 mL Oral DAILY Efren Corral P.A.-C.        And    bisacodyl (Dulcolax) suppository 10 mg  10 mg Rectal QDAY PRN Efren Corral P.A.-C.        [START ON 4/9/2025] omeprazole (PriLOSEC) capsule 20 mg  20 mg Oral DAILY NYLA Dela Cruz-CHERRIE.        mag hydrox-al hydrox-simeth (Maalox Plus Es Or Mylanta Ds) suspension 30 mL  30 mL Oral Q4HRS PRN NYLA Dela Cruz-LUZ        diphenhydrAMINE (Benadryl) tablet/capsule 25 mg  25 mg Oral HS PRN - MR X 1 Efren Corral P.A.-C.        electrolyte-A (Plasmalyte-A) infusion   Intravenous PRN Efren Corral P.A.-C.           Allergies  No Known Allergies    Vital Signs last 24 hours  Temp:  [35.9 °C (96.6 °F)] 35.9 °C (96.6 °F)  Pulse:  [61-64] 64  Resp:  [16-17] 16  BP: (147-152)/(84-90) 152/90  SpO2:  [96 %-99 %] 99 %    Physical Exam  Physical Exam  Vitals reviewed.   Constitutional:       General: He is not in acute distress.  Cardiovascular:      Rate and Rhythm: Normal rate and regular rhythm.      Comments: V wires in place    Chest tubes with serosanguinous drainage  Pulmonary:      Effort: No respiratory distress.      Comments: Orotracheally intubated  Abdominal:      General: There is no distension.   Neurological:      Comments: Pupils equal and reactive         Fluids    Intake/Output Summary (Last 24 hours) at 4/8/2025 1137  Last data filed at 4/8/2025 1117  Gross per 24 hour   Intake 1807.76 ml   Output 400 ml   Net 1407.76 ml       Laboratory  Recent Results (from the past 48 hours)   CBC WITH DIFFERENTIAL    Collection Time: 04/07/25  9:39 AM   Result Value Ref Range    WBC 6.2 4.8 - 10.8 K/uL    RBC 5.43 4.70 - 6.10 M/uL    Hemoglobin 15.9 14.0 - 18.0 g/dL    Hematocrit 47.7 42.0 - 52.0 %    MCV 87.8 81.4 - 97.8 fL    MCH 29.3 27.0 - 33.0 pg    MCHC 33.3 32.3 - 36.5  g/dL    RDW 41.6 35.9 - 50.0 fL    Platelet Count 284 164 - 446 K/uL    MPV 10.5 9.0 - 12.9 fL    Neutrophils-Polys 63.60 44.00 - 72.00 %    Lymphocytes 23.30 22.00 - 41.00 %    Monocytes 7.90 0.00 - 13.40 %    Eosinophils 4.00 0.00 - 6.90 %    Basophils 1.00 0.00 - 1.80 %    Immature Granulocytes 0.20 0.00 - 0.90 %    Nucleated RBC 0.00 0.00 - 0.20 /100 WBC    Neutrophils (Absolute) 3.93 1.82 - 7.42 K/uL    Lymphs (Absolute) 1.44 1.00 - 4.80 K/uL    Monos (Absolute) 0.49 0.00 - 0.85 K/uL    Eos (Absolute) 0.25 0.00 - 0.51 K/uL    Baso (Absolute) 0.06 0.00 - 0.12 K/uL    Immature Granulocytes (abs) 0.01 0.00 - 0.11 K/uL    NRBC (Absolute) 0.00 K/uL   Comp Metabolic Panel    Collection Time: 04/07/25  9:39 AM   Result Value Ref Range    Sodium 137 135 - 145 mmol/L    Potassium 4.4 3.6 - 5.5 mmol/L    Chloride 105 96 - 112 mmol/L    Co2 22 20 - 33 mmol/L    Anion Gap 10.0 7.0 - 16.0    Glucose 106 (H) 65 - 99 mg/dL    Bun 14 8 - 22 mg/dL    Creatinine 0.95 0.50 - 1.40 mg/dL    Calcium 9.0 8.5 - 10.5 mg/dL    Correct Calcium 8.9 8.5 - 10.5 mg/dL    AST(SGOT) 36 12 - 45 U/L    ALT(SGPT) 48 2 - 50 U/L    Alkaline Phosphatase 66 30 - 99 U/L    Total Bilirubin 0.6 0.1 - 1.5 mg/dL    Albumin 4.1 3.2 - 4.9 g/dL    Total Protein 6.9 6.0 - 8.2 g/dL    Globulin 2.8 1.9 - 3.5 g/dL    A-G Ratio 1.5 g/dL   PT    Collection Time: 04/07/25  9:39 AM   Result Value Ref Range    PT 13.1 12.0 - 14.6 sec    INR 0.99 0.87 - 1.13   APTT    Collection Time: 04/07/25  9:39 AM   Result Value Ref Range    APTT 26.0 24.7 - 36.0 sec   HEMOGLOBIN A1C    Collection Time: 04/07/25  9:39 AM   Result Value Ref Range    Glycohemoglobin 5.6 4.0 - 5.6 %    Est Avg Glucose 114 mg/dL   ESTIMATED GFR    Collection Time: 04/07/25  9:39 AM   Result Value Ref Range    GFR (CKD-EPI) 90 >60 mL/min/1.73 m 2   URINALYSIS    Collection Time: 04/07/25  9:40 AM    Specimen: Urine   Result Value Ref Range    Color Yellow     Character Clear     Specific Gravity 1.023  <1.035    Ph 6.0 5.0 - 8.0    Glucose Negative Negative mg/dL    Ketones Negative Negative mg/dL    Protein Negative Negative mg/dL    Bilirubin Negative Negative    Urobilinogen, Urine 1.0 <=1.0 EU/dL    Nitrite Negative Negative    Leukocyte Esterase Negative Negative    Occult Blood Negative Negative    Micro Urine Req see below    Urine Drug Screen    Collection Time: 25  9:40 AM   Result Value Ref Range    Amphetamines Urine Negative Negative    Barbiturates Negative Negative    Benzodiazepines Negative Negative    Cocaine Metabolite Negative Negative    Fentanyl, Urine Negative Negative    Methadone Negative Negative    Opiates Negative Negative    Oxycodone Negative Negative    Phencyclidine -Pcp Negative Negative    Propoxyphene Negative Negative    Cannabinoid Metab Negative Negative   PreAdmit COD    Collection Time: 25  9:40 AM   Result Value Ref Range    ABO Grouping Only B     Rh Grouping Only POS     Antibody Screen Scrn NEG    S. Aureus By PCR, Nasal Complete    Collection Time: 25  9:40 AM    Specimen: Respirate   Result Value Ref Range    Staph aureus by PCR Negative Negative    MRSA by PCR Negative Negative   EKG    Collection Time: 25 10:43 AM   Result Value Ref Range    Report       [facility]    Test Date:  2025  Pt Name:    WAGNER MCDONNELL              Department: [facility]  MRN:        [id number]                      Room:  Gender:     Male                         Technician: BONITA  :        1960                   Requested By:ALFONZO LYON  Order #:    [id number]                    Reading MD: Esteban Joseph MD    Measurements  Intervals                                Axis  Rate:       60                           P:          77  FL:         162                          QRS:        40  QRSD:       87                           T:          56  QT:         420  QTc:        420    Interpretive Statements  Sinus rhythm  Probable anteroseptal infarct,  old  No previous ECG available for comparison  Electronically Signed On 04- 10:43:01 PDT by Esteban Joseph MD     ABO Rh Confirm    Collection Time: 04/08/25  6:05 AM   Result Value Ref Range    ABO Rh Confirm B POS    POCT activated clotting time device results    Collection Time: 04/08/25  8:15 AM   Result Value Ref Range    Istat Activated Clotting Time 124 74 - 137 sec   POCT arterial blood gas device results    Collection Time: 04/08/25  9:20 AM   Result Value Ref Range    Ph 7.252 (LL) 7.350 - 7.450    Pco2 44.6 32.0 - 48.0 mmHg    Po2 302 (H) 83 - 108 mmHg    Tco2 21 (L) 32 - 48 mmol/L    S02 100 (H) 93 - 99 %    Hco3 19.6 (L) 21.0 - 28.0 mmol/L    BE -7 (L) -4 - 3 mmol/L    Body Temp 35.8 C degrees    Ph Temp Salma 7.268 (LL) 7.350 - 7.450    Pco2 Temp Co 42.3 32.0 - 48.0 mmHg    Po2 Temp Cor 297 (H) 83 - 108 mmHg    Specimen Arterial    POCT sodium device results    Collection Time: 04/08/25  9:20 AM   Result Value Ref Range    Istat Sodium 143 135 - 145 mmol/L   POCT potassium device results    Collection Time: 04/08/25  9:20 AM   Result Value Ref Range    Istat Potassium 4.0 3.6 - 5.5 mmol/L   POCT ionized CA device results    Collection Time: 04/08/25  9:20 AM   Result Value Ref Range    Istat Ionized Calcium 1.09 (L) 1.10 - 1.30 mmol/L   POCT hematocrit and hemoglobin device results    Collection Time: 04/08/25  9:20 AM   Result Value Ref Range    Istat Hematocrit 42 42 - 52 %    Istat Hemoglobin 14.3 14.0 - 18.0 g/dL   POCT arterial blood gas device results    Collection Time: 04/08/25  9:51 AM   Result Value Ref Range    Ph 7.364 7.350 - 7.450    Pco2 39.3 32.0 - 48.0 mmHg    Po2 340 (H) 83 - 108 mmHg    Tco2 24 (L) 32 - 48 mmol/L    S02 100 (H) 93 - 99 %    Hco3 22.4 21.0 - 28.0 mmol/L    BE -3 -4 - 3 mmol/L    Body Temp 35.3 C degrees    Ph Temp Salma 7.388 7.350 - 7.450    Pco2 Temp Co 36.5 32.0 - 48.0 mmHg    Po2 Temp Cor 332 (H) 83 - 108 mmHg    Specimen Arterial    POCT sodium device  results    Collection Time: 04/08/25  9:51 AM   Result Value Ref Range    Istat Sodium 136 135 - 145 mmol/L   POCT potassium device results    Collection Time: 04/08/25  9:51 AM   Result Value Ref Range    Istat Potassium 4.3 3.6 - 5.5 mmol/L   POCT ionized CA device results    Collection Time: 04/08/25  9:51 AM   Result Value Ref Range    Istat Ionized Calcium 0.97 (L) 1.10 - 1.30 mmol/L   POCT hematocrit and hemoglobin device results    Collection Time: 04/08/25  9:51 AM   Result Value Ref Range    Istat Hematocrit 31 (L) 42 - 52 %    Istat Hemoglobin 10.5 (L) 14.0 - 18.0 g/dL   POCT arterial blood gas device results    Collection Time: 04/08/25 10:52 AM   Result Value Ref Range    Ph 7.342 (L) 7.350 - 7.450    Pco2 40.7 32.0 - 48.0 mmHg    Po2 193 (H) 83 - 108 mmHg    Tco2 23 (L) 32 - 48 mmol/L    S02 100 (H) 93 - 99 %    Hco3 22.1 21.0 - 28.0 mmol/L    BE -3 -4 - 3 mmol/L    Body Temp 36.3 C degrees    Ph Temp Salma 7.352 7.350 - 7.450    Pco2 Temp Co 39.5 32.0 - 48.0 mmHg    Po2 Temp Cor 189 (H) 83 - 108 mmHg    Specimen Arterial    POCT sodium device results    Collection Time: 04/08/25 10:52 AM   Result Value Ref Range    Istat Sodium 139 135 - 145 mmol/L   POCT potassium device results    Collection Time: 04/08/25 10:52 AM   Result Value Ref Range    Istat Potassium 4.1 3.6 - 5.5 mmol/L   POCT ionized CA device results    Collection Time: 04/08/25 10:52 AM   Result Value Ref Range    Istat Ionized Calcium 1.14 1.10 - 1.30 mmol/L   POCT hematocrit and hemoglobin device results    Collection Time: 04/08/25 10:52 AM   Result Value Ref Range    Istat Hematocrit 38 (L) 42 - 52 %    Istat Hemoglobin 12.9 (L) 14.0 - 18.0 g/dL   POCT arterial blood gas device results    Collection Time: 04/08/25 11:20 AM   Result Value Ref Range    Ph 7.394 7.350 - 7.450    Pco2 35.6 32.0 - 48.0 mmHg    Po2 178 (H) 83 - 108 mmHg    Tco2 23 (L) 32 - 48 mmol/L    S02 100 (H) 93 - 99 %    Hco3 21.7 21.0 - 28.0 mmol/L    BE -3 -4 - 3  mmol/L    Body Temp 36.1 C degrees    O2 Therapy 100 %    iPF Ratio 178     Ph Temp Salma 7.407 7.350 - 7.450    Pco2 Temp Co 34.2 32.0 - 48.0 mmHg    Po2 Temp Cor 173 (H) 83 - 108 mmHg    Specimen Arterial     DelSys Vent     End Tidal Carbon Dioxide 26 mmhg    Peep End Expiratory Pressure 8 cmh20    Percent Minute Volume 160     Mode ASV    POCT sodium device results    Collection Time: 25 11:20 AM   Result Value Ref Range    Istat Sodium 138 135 - 145 mmol/L   POCT potassium device results    Collection Time: 25 11:20 AM   Result Value Ref Range    Istat Potassium 4.5 3.6 - 5.5 mmol/L   POCT ionized CA device results    Collection Time: 25 11:20 AM   Result Value Ref Range    Istat Ionized Calcium 1.18 1.10 - 1.30 mmol/L   EKG on arrival to CSU    Collection Time: 25 11:34 AM   Result Value Ref Range    Report       [facility]    Test Date:  2025  Pt Name:    WAGNER MCDONNELL              Department: 161  MRN:        [id number]                      Room:       [facility]  Gender:     Male                         Technician: TXM  :        1960                   Requested By:KYM JONES  Order #:    [id number]                    Reading MD:    Measurements  Intervals                                Axis  Rate:       66                           P:          81  SD:         161                          QRS:        42  QRSD:       84                           T:          63  QT:         458  QTc:        480    Interpretive Statements  Sinus rhythm  Low voltage, precordial leads  Borderline prolonged QT interval  Compared to ECG 2025 09:53:16  Low QRS voltage now present  Myocardial infarct finding no longer present         Assessment/Plan  * Coronary artery disease  Assessment & Plan  100% LAD occlusion with collateral reconstitution from the circumflex.   Underwent GRIER-LAD with Dr. Buckley.  Operative course was uneventful.  Post-CPB echo notable for normal BiV function,  trace MR. Risk for vasoplegia with pre-op ARB.  Patient arrived to the ICU intubated and accompanied by anesthesia.  Monitor hemodynamics, chest tube output, urine output, and hemoglobin    Titrate norepi to maintain MAP>65, SBP<140  Discussed with Dr. Buckley.     Primary hypertension  Assessment & Plan  Hold home irbesartan and HCTZ    Encounter for weaning from ventilator (HCC)  Assessment & Plan  Post cardiac surgery ventilator management for acute postoperative respiratory insufficiency due to sternotomy, anesthesia, sedative, narcotic, and decreased mobility.   Titrate FiO2 to SpO2 92-96%  Incentive spirometry  Increase activity; up to chair as tolerated.      BPH (benign prostatic hyperplasia)  Assessment & Plan  Hold home tamsulosin    Osteoarthritis of right hip- (present on admission)  Assessment & Plan  Hold glucosamine and celecoxib      Critical care time 70 minutes, excluding procedure

## 2025-04-08 NOTE — ANESTHESIA PREPROCEDURE EVALUATION
Case: [id number] Date/Time: 04/08/25 0715    Procedures:       CORONARY BYPASS GRAFTING X1, ENDOSCOPIC VEIN HARVEST, TRANSESOPHAGEAL ECHOCARDIOGRAM      ECHOCARDIOGRAM, TRANSESOPHAGEAL, INTRAOPERATIVE    Pre-op diagnosis: CORONARY ARTERY DISEASE    Location: [facility] OR 03 / SURGERY [facility]    Surgeons: Bertram Buckley D.O.            Relevant Problems   Other   (positive) Osteoarthritis of right hip     Cad, 100% LAD    Physical Exam    Airway   Mallampati: II  TM distance: >3 FB  Neck ROM: full       Cardiovascular - normal exam  Rhythm: regular  Rate: normal  (-) murmur     Dental - normal exam           Pulmonary - normal exam  Breath sounds clear to auscultation     Abdominal    Neurological - normal exam                   Anesthesia Plan    ASA 4       Plan - general       Airway plan will be ETT          Induction: intravenous    Postoperative Plan: Postoperative administration of opioids is intended.    Pertinent diagnostic labs and testing reviewed    Informed Consent:    Anesthetic plan and risks discussed with patient.    Use of blood products discussed with: patient whom consented to blood products.

## 2025-04-08 NOTE — DISCHARGE PLANNING
Discharge Appointments/outpatient referrals/ and  set up:    Cardiac surgery follow up appointment made for 4-5 weeks out    Hospital discharge team/schedulers called for [facility] cardiology f/u appointment for MD or APRN within 3-4 weeks if possible.    Aortic surveillance program/vascular medicine referral not needed, orders not placed.    Anticoagulation referral/ coumadin clinic referral not needed and orders not placed.    INR draws are not indicated for CABG procedure.    Will await PT/OT notes and medical progression to determine further discharge needs.

## 2025-04-08 NOTE — ASSESSMENT & PLAN NOTE
Post cardiac surgery ventilator management for acute postoperative respiratory insufficiency due to sternotomy, anesthesia, sedative, narcotic, and decreased mobility. Extubted POD 0 uneventfully.  O2 for SpO2 92-96%  Incentive spirometry  Increase activity; up to chair as tolerated.

## 2025-04-08 NOTE — ANESTHESIA TIME REPORT
Anesthesia Start and Stop Event Times       Date Time Event    4/8/2025 0721 Ready for Procedure     0730 Anesthesia Start     1117 Anesthesia Stop          Responsible Staff  04/08/25      Name Role Begin End    Stanislav Reilly M.D. Anesth 0730 1117          Overtime Reason:  no overtime (within assigned shift)    Comments:

## 2025-04-08 NOTE — ASSESSMENT & PLAN NOTE
2 N1 M0 rectal cancer 2012, neoadjuvant chemotherapy and radiotherapy followed by surgery (APR), then had multiple  complications after the surgery,    Hold glucosamine and celecoxib

## 2025-04-08 NOTE — PROGRESS NOTES
Medication history reviewed with PT at bedside    [facility] is complete per PT reporting    Allergies reviewed.     Patient denies any outpatient antibiotics in the last 30 days.     Patient is not taking anticoagulants.    Dispense history is available.    Preferred pharmacy for this visit - [facility] ([phone])

## 2025-04-08 NOTE — H&P
REFERRING PHYSICIAN: Joaquín Lawler MD.     CONSULTING PHYSICIAN: Bertram Buckley DO     CHIEF COMPLAINT: Fatigue    HISTORY OF PRESENT ILLNESS: The patient is a 64 y.o. male with a a past medical history significant for coronary artery disease, hypertension, hyperlipidemia, benign prostatic hyperplasia who presented to the clinic on 3/6/25 with progressive fatigue and shortness of breath. He denied chest pain orthopnea, PND, syncope. He remains active by walking daily. There have been no interval changes to initial H&P. No new issues today.      PAST MEDICAL HISTORY:   Active Ambulatory Problems     Diagnosis Date Noted    Osteoarthritis of right hip 10/23/2023     Resolved Ambulatory Problems     Diagnosis Date Noted    No Resolved Ambulatory Problems     Past Medical History:   Diagnosis Date    Acute nasopharyngitis 3/12/24    Arthritis 03/20/2025    Breath shortness     High cholesterol 03/20/2025    Hypertension 03/20/2025    Snoring 03/20/2025       PAST SURGICAL HISTORY:   Past Surgical History:   Procedure Laterality Date    [place] TOTAL HIP ARTHROPLASTY Right 12/19/2023    Procedure: RIGHT TOTAL HIP ARTHROPLASTY ANTERIOR APPROACH;  Surgeon: Pancho Castro M.D.;  Location: [facility];  Service: Orthopedics        ALLERGIES: No Known Allergies     CURRENT MEDICATIONS:   Current Facility-Administered Medications:     acetaminophen (Tylenol) tablet 1,000 mg, 1,000 mg, Oral, Once, Bertram Buckley D.O.    lidocaine (Xylocaine) 1 % injection 0.5 mL, 0.5 mL, Intradermal, Once PRN, SILVANA Jenkins.OSofia    metoprolol tartrate (Lopressor) tablet 12.5 mg, 12.5 mg, Oral, Once, SILVANA Jenkins.O.    FAMILY HISTORY:   Family History   Problem Relation Age of Onset    Diabetes Mother     Dementia Father     Heart Disease Brother         SOCIAL HISTORY:   Social History     Socioeconomic History    Marital status:      Spouse name: Not on file    Number of  "children: Not on file    Years of education: Not on file    Highest education level: Not on file   Occupational History    Not on file   Tobacco Use    Smoking status: Never    Smokeless tobacco: Never   Vaping Use    Vaping status: Never Used   Substance and Sexual Activity    Alcohol use: Yes     Alcohol/week: 1.2 oz     Types: 2 Standard drinks or equivalent per week     Comment: social    Drug use: No    Sexual activity: Not on file   Other Topics Concern    Not on file   Social History Narrative    Not on file     Social Drivers of Health     Financial Resource Strain: Not on file   Food Insecurity: Not on file   Transportation Needs: Not on file   Physical Activity: Not on file   Stress: Not on file   Social Connections: Not on file   Intimate Partner Violence: Not on file   Housing Stability: Not on file       REVIEW OF SYSTEMS:  Review of Systems   Constitutional:  Positive for malaise/fatigue. Negative for chills, diaphoresis, fever and weight loss.   HENT:  Negative for ear discharge and sore throat.    Eyes:  Negative for blurred vision and double vision.   Respiratory:  Positive for shortness of breath. Negative for cough.    Cardiovascular:  Negative for chest pain, palpitations, orthopnea, claudication, leg swelling and PND.   Gastrointestinal:  Negative for abdominal pain, heartburn, nausea and vomiting.   Genitourinary:  Negative for dysuria, frequency, hematuria and urgency.   Skin:  Negative for rash.   Neurological:  Negative for dizziness, sensory change, speech change, weakness and headaches.   Endo/Heme/Allergies:  Does not bruise/bleed easily.   Psychiatric/Behavioral:  Negative for depression and suicidal ideas.      PHYSICAL EXAMINATION:    BP (!) 152/90   Pulse 61   Temp 35.9 °C (96.6 °F) (Temporal)   Resp 17   Ht 1.854 m (6' 1\")   Wt 122 kg (269 lb 6.4 oz)   SpO2 96%   BMI 35.54 kg/m²    Physical Exam  Vitals and nursing note reviewed.   Constitutional:       General: He is not in " acute distress.  HENT:      Head: Normocephalic.      Right Ear: External ear normal.      Left Ear: External ear normal.      Nose: Nose normal.      Mouth/Throat:      Mouth: Mucous membranes are moist.      Pharynx: Oropharynx is clear.   Eyes:      Pupils: Pupils are equal, round, and reactive to light.   Cardiovascular:      Rate and Rhythm: Normal rate.      Pulses: Normal pulses.   Pulmonary:      Effort: Pulmonary effort is normal.   Abdominal:      General: Abdomen is flat.      Palpations: Abdomen is soft.   Musculoskeletal:         General: Normal range of motion.      Cervical back: Normal range of motion.   Skin:     General: Skin is warm and dry.   Neurological:      General: No focal deficit present.      Mental Status: He is alert and oriented to person, place, and time.   Psychiatric:         Mood and Affect: Mood normal.         Behavior: Behavior normal.         Judgment: Judgment normal.       LABS REVIEWED:  Lab Results   Component Value Date/Time    SODIUM 137 04/07/2025 09:39 AM    POTASSIUM 4.4 04/07/2025 09:39 AM    CHLORIDE 105 04/07/2025 09:39 AM    CO2 22 04/07/2025 09:39 AM    GLUCOSE 106 (H) 04/07/2025 09:39 AM    BUN 14 04/07/2025 09:39 AM    CREATININE 0.95 04/07/2025 09:39 AM    BUNCREATRAT 21.1 (H) 12/12/2024 10:14 AM      Lab Results   Component Value Date/Time    PROTHROMBTM 13.1 04/07/2025 09:39 AM    INR 0.99 04/07/2025 09:39 AM      Lab Results   Component Value Date/Time    WBC 6.2 04/07/2025 09:39 AM    RBC 5.43 04/07/2025 09:39 AM    HEMOGLOBIN 15.9 04/07/2025 09:39 AM    HEMATOCRIT 47.7 04/07/2025 09:39 AM    MCV 87.8 04/07/2025 09:39 AM    MCH 29.3 04/07/2025 09:39 AM    MCHC 33.3 04/07/2025 09:39 AM    MPV 10.5 04/07/2025 09:39 AM    NEUTSPOLYS 63.60 04/07/2025 09:39 AM    LYMPHOCYTES 23.30 04/07/2025 09:39 AM    MONOCYTES 7.90 04/07/2025 09:39 AM    EOSINOPHILS 4.00 04/07/2025 09:39 AM    BASOPHILS 1.00 04/07/2025 09:39 AM        IMAGING REVIEWED AND  INTERPRETED:    ECHOCARDIOGRAM   11/27/24 [facility]  Impression  Normal left ventricular systolic function. The left ventruclar ejection fraction is visually estimated to be 65%  The right ventricle is normal in size and systolic function  No significant valvular abnormalities.     CARDIAC CATHETERIZATION   12/12/24 [facility]  Impression   1. 100% occluded proximal LAD with reconstitution in the distal portion from left-to-left collateralization from a distal circumflex artery   2. Nonobstructive disease seen in the circumflex artery and obtuse marginal artery moderate plaque formation noted   3. Proximal RCA disease in the range of 35-40%    IMPRESSION:  Occlusion of LAD, HTN, HLD, BPH      PLAN:  I recommend CABG x 1.      The procedure, its risks, benefits, potential complications and alternative treatments were discussed previously with the patient in detail including the risks should he decide not to undergo my recommended treatment. All of his questions were answered, at that time,  to his satisfaction and he is willing to proceed with the operation.The operative mortality risk is approximately 0.5%. The STS mortality risk score is 0.2% and the morbidity and mortality risk score is 4%. The scores were discussed with patient.     Thank you for this very challenging consultation and participation in the patient’s care.  I will keep you apprised of all future developments.       The operation is scheduled for today,Sincerely,      Bertram Buckley DO.

## 2025-04-08 NOTE — PROGRESS NOTES
RSBI 6  Pinsp 5  RR 13  PEEP 8  FiO2 40%  NIF -40  VC 2.4  PACO2 20  pH 7.53  SpO2 99%    Hemodynamics On 0.01 of norepi    Extubation time 1403  Total time intubated 2 hours 55 minutes  Extubated to 4 L/min via NC

## 2025-04-08 NOTE — ANESTHESIA PROCEDURE NOTES
Arterial Line    Performed by: Stanislav Reilly M.D.  Authorized by: Stanislav Reilly M.D.    Start Time:  4/8/2025 7:38 AM  End Time:  4/8/2025 7:39 AM  Localization: surface landmarks    Patient Location:  OR  Indication: continuous blood pressure monitoring        Catheter Size:  20 G  Seldinger Technique?: Yes    Laterality:  Right  Site:  Radial artery  Line Secured:  Antimicrobial disc, tape and transparent dressing  Events: patient tolerated procedure well with no complications

## 2025-04-08 NOTE — OP REPORT
Operative report    PreOp Diagnosis: Occlusion of LAD, hypertension, hyperlipidemia      PostOp Diagnosis: Same as above      Procedure(s):  CORONARY BYPASS GRAFTING X1 WITH SKELENTONIZED LIMA TO LAD- Wound Class: Clean with Drain  ECHOCARDIOGRAM, TRANSESOPHAGEAL, INTRAOPERATIVE - Wound Class: Clean Contaminated    Surgeon(s):  Bertram Buckley D.O.    Anesthesiologist/Type of Anesthesia:  Anesthesiologist: Stanislav Reilly M.D.  Perfusionist: Seamus Ugarte/General    Surgical Staff:  Assistant: Efren Corral P.A.-C.  Circulator: Katerina Oliveros R.N.; Sandra Self R.N.  Scrub Person: Junie Anderson; Saadia Funes    Specimens removed if any:  * No specimens in log *    Estimated Blood Loss: Cardiopulmonary bypass    Findings:     Pre and postoperative echo showed no regional wall motion abnormalities and normal ejection fraction.  No significant valvular abnormalities.    Mammary artery was on the smaller side 2 mm in size, with excellent flow.    LAD was diseased throughout its entire course.  The distal third there was areas of plaquing and wall thickening.  But probing with a 1.5 mm probe showed patency of the way down to the apex.  There is also patency at 1.5 mm up to the proximal third at the area of occlusion.    LAD anastomosis had brisk flow noted distally as well as down the diagonal branches indicating patency of the graft.    Patient did have atrial fibrillation prior to going on cardiopulmonary bypass.  He may resume normal sinus rhythm after cardioversion x 1.    Patient did require defibrillation x 1 to resume normal sinus rhythm post cross-clamp removal.  He  from bypass without issue    Complications: None immediate    Patient condition: Guarded to ICU    Chest tubes: Mediastinal: 32 [place] x2    Pacing wires: RV x 1    Pericardium: Open    Cross-clamp time: 19 minutes    Pump time: 31 minutes    Cardioplegia: Cold blood antegrade [place] micro cardioplegia given.  Initial induction with  1000 mL.  300 cc of warm blood given his antegrade hotshot    Vent: Aortic root      Procedure:    After informed consent was obtained, the patient was brought to the operating room.  They were placed in supine position on the operating table.  General anesthesia was induced via endotracheal tube.  Lines, arterial line, Gatica were placed by the nursing and anesthesia teams.  They received pre-incision antibiotics and beta-blockade.  The patient was prepped in a sterile fashion from their chin to their feet.  Drapes were placed in a sterile fashion.  The procedure was begun with a timeout.  I was joined during the case by Efren Corral PA-C.  His help was necessary for cannulation, retraction for exposure, decannulation and closure.        I made a sternotomy incision with a 10 blade scalpel. I deepened my sternal incision with left cautery to the sternum.  The sternum was divided in the midline using the sternal saw.  Hemostasis of the sternal edges was obtained using bone putty and electrocautery.  The mammary retractor was brought up at that point and the left hemisternum was elevated.  Using the electrocautery with occasional clips for side branches, the mammary was dissected down from its takeoff at the subclavian down to its bifurcation into the muscular phrenic and inferior epigastric, in a skeletonized fashion.    Once the mammary was completely dissected out, systemic heparinization was performed, using 400 units/kg of heparin, to achieve an ACT greater than 480 seconds. The mammary was ligated distally and divided.  There was excellent flow noted.  I placed a small clip at the distal end of the mammary, treated it with papaverine, and placed it back in the left chest for safe keeping.  The distal stump was secured.  The mammary retractor was removed and sternal retractor was placed in the chest open.    With the chest open, I then dissected out the innominate vein and the pericardium.  I then opened the  pericardium and inverted T fashion.  I created pericardial well, suspending the pericardial edges from the skin edges using interrupted 2-0 Ethibond sutures.  Inspection of the heart and the aorta revealed no obvious pathology.  Palpation of the aorta revealed no calcifications precluding cannulation or cross-clamp.  I cannulated the aorta using a direct Seldinger's technique and ENRICO guidance.    Next, I placed the venous cannula into the IVC via the right atrium.  Again this was confirmed with ENRICO.  Next, I then placed a pursestring on the mid ascending aorta, through which I placed my antegrade needle.      With my cannulas in place, I then inspected the vein.  It was of reasonable quality.  The distal end was spatulated and it was marked to help with orienting the graft.  Cardiopulmonary bypass was then initiated.    Once on full flow, I then retracted the heart to inspect my target.  It is noted above.  It was then marked with the [place] blade.  Finally, I dissected the pulmonary artery away from the distal portion of the aorta to facilitate cross-clamp placement and complete isolation of the heart.  I then placed a cross-clamp and the heart was arrested.  Ice slush was placed on the heart to assist with cooling.    After I had arrest, the heart was repositioned again to expose the LAD.  I dissected this out with a [place] blade and opened it with 11 blade scalpel.  I extended the arteriotomy with Sarah scissors.  I then created a keyhole defect in the pericardium through which I brought my mammary pedicle.  I then sized it to the site of the anastomosis and divided mammary pedicle, discarding the distal portion.  I then fashioned the distal end of the mammary.  An end LIMA to side LAD anastomosis was created with a running 7-0 Prolene suture.  Upon completion, the pedicle was opened and flow was noted to run distal.  It was also noted to be hemostatic.  Bulldog clamp was reapplied and I tacked the pedicle to the  epicardium with interrupted 6-0 Prolene sutures.    Patient was de-aired, flows dropped, and cross-clamp removed.  As the heart reperfused the placement of right ventricular pacing leads and my mediastinal chest tubes.  They were brought out through the epigastrium in the usual fashion.  Surgical sites were checked and found to be hemostatic.  The patient was then  from bypass in the usual fashion.  Venous cannula and aortic root vent were removed.  Protamine was administered to reverse systemic heparinization.  As the protamine was administered, the patient's blood volume in the pump was administered back to the aortic cannula.  Once that was given back, that cannula was removed.  Sites were oversewn as needed.  Hemostasis was verified to be good.  Proceeded with closure of the sternum using interrupted #5 sternal wires and zip fix sternal bands.  The wound was then irrigated and closed in layers using absorbable sutures.  The patient tolerated the procedure well, all instrument counts were correct x2, and he was taken to the ICU in guarded condition.        4/8/2025 10:39 AM Bertram Buckley D.O.

## 2025-04-08 NOTE — ANESTHESIA PROCEDURE NOTES
ENRICO    Date/Time: 4/8/2025 7:50 AM    Performed by: Stanislav Reilly M.D.  Authorized by: Stanislav Reilly M.D.    Start Time:4/8/2025 7:50 AM  Preanesthetic Checklist: patient identified, IV checked, site marked, risks and benefits discussed, surgical consent, monitors and equipment checked, pre-op evaluation and timeout performed    Indication for ENRICO: diagnostic   Patient Location: OR  Intubated: Yes  Bite Block: Yes  Heart Visualized: Yes  Insertion: atraumatic    **See FULL ENRICO report in patient's chart via CV Syn[facility]**

## 2025-04-08 NOTE — DISCHARGE INSTR - DIET
Heart-Healthy Nutrition Therapy    A heart-healthy diet is recommended to reduce your unhealthy blood cholesterol levels, manage high blood pressure, and lower your risk for heart disease.    To follow a heart-healthy diet,    Eat a balanced diet with whole grains, fruits and vegetables, and lean protein sources.  Achieve and maintain a healthy weight.  Choose heart-healthy unsaturated fats. Limit saturated fats, trans fats, and cholesterol intake. Eat more plant-based or vegetarian meals using beans and soy foods for protein.  Eat whole, unprocessed foods to limit the amount of sodium (salt) you eat.  Limit refined carbohydrates especially sugar, sweets and sugar-sweetened beverages.  If you drink alcohol, do so in moderation: one serving per day (women) and two servings per day (men).  One serving is equivalent to 12 ounces beer, 5 ounces wine, or 1.5 ounces distilled spirits    Tips for Choosing Heart-Healthy Fats    Choose lean protein and low-fat dairy foods to reduce saturated fat intake.    Saturated fat is usually found in animal-based protein and is associated with certain health risks. Saturated fat is the biggest contributor to raised low-density lipoprotein (LDL) cholesterol levels in the diet. Research shows that limiting saturated fat lowers unhealthy cholesterol levels. Eat no more than 5-6% of your total calories each day from saturated fat. Ask your registered dietitian nutritionist (RDN) to help you determine how much saturated fat is right for you.  There are many foods that do not contain large amounts of saturated fats. Swapping these foods to replace foods high in saturated fats will help you limit the saturated fat you eat and improve your cholesterol levels. You can also try eating more plant-based or vegetarian meals.        Avoid trans fats    Trans fats increase levels of LDL-cholesterol. Hydrogenated fat in processed foods is the main source of trans fats in foods.   Trans fats can be  found in stick margarine, shortening, processed sweets, baked goods, some fried foods, and packaged foods made with hydrogenated oils. Avoid foods with “partially hydrogenated oil” on the ingredient list such as: cookies, pastries, baked goods, biscuits, crackers, microwave popcorn, and frozen dinners.    Choose foods with heart healthy fats    Polyunsaturated and monounsaturated fat are unsaturated fats that may help lower your blood cholesterol level when used in place of saturated fat in your diet.  Ask your RDN about taking a dietary supplement with plant sterols and stanols to help lower your cholesterol level.  Research shows that substituting saturated fats with unsaturated fats is beneficial to cholesterol levels. Try these easy swaps:      Limit the amount of cholesterol you eat to less than 200 milligrams per day.    Cholesterol is a substance carried through the bloodstream via lipoproteins, which are known as “transporters” of fat. Some body functions need cholesterol to work properly, but too much cholesterol in the bloodstream can damage arteries and build up blood vessel linings (which can lead to heart attack and stroke). You should eat less than 200 milligrams cholesterol per day.  People respond differently to eating cholesterol. There is no test available right now that can figure out which people will respond more to dietary cholesterol and which will respond less. For individuals with high intake of dietary cholesterol, different types of increase (none, small, moderate, large) in LDL-cholesterol levels are all possible.    Food sources of cholesterol include egg yolks and organ meats such as liver, gizzards.  Limit egg yolks to two to four per week and avoid organ meats like liver and gizzards to control cholesterol intake.    Tips for Choosing Heart-Healthy Carbohydrates    Consume foods rich in viscous (soluble) fiber    Viscous, or soluble, fiber is found in the walls of plant cells. Viscous  fiber is found only in plant-based foods--animal-based foods like meat or dairy products do not contain fiber. In the stomach, viscous fibers absorb water and swell to form a thick, jelly-like mass. This helps to lower your unhealthy cholesterol  Rich sources of viscous fiber include asparagus, [place] sprouts, sweet potatoes, turnips, apricots, mangoes, oranges, legumes, barley, oats, and oat bran.  Eat at least 5 to 10 grams of viscous fiber each day. As you increase your fiber intake gradually, also increase the amount of water you drink. This will help prevent constipation.  If you have difficulty achieving this goal, ask your RDN about fiber laxatives. Choose fiber supplements made with viscous fibers such as psyllium seed husks or methylcellulose to help lower unhealthy cholesterol.    Limit refined carbohydrates    There are three types of carbohydrates: starches, sugar, and fiber. Some carbohydrates occur naturally in food, like the starches in rice or corn or the sugars in fruits and milk. Refined carbohydrates--foods with high amounts of simple sugars--can raise triglyceride levels. High triglyceride levels are associated with coronary heart disease.  Some examples of refined carbohydrate foods are table sugar, sweets, and beverages sweetened with added sugar.    Tips for Reducing Sodium (Salt)  Although sodium is important for your body to function, too much sodium can be harmful for people with high blood pressure.  As sodium and fluid buildup in your tissues and bloodstream, your blood pressure increases. High blood pressure may cause damage to other organs and increase your risk for a stroke.    Keep your salt intake to 2300 milligrams or less per day. Even if you take a pill for blood pressure or a water pill (diuretic) to remove fluid, it is still important to have less salt in your diet. Ask your RDN what amount of sodium is right for you.    Avoid processed foods.  Eat more fresh foods.  Fresh  "fruits and vegetables are naturally low in sodium, as well as frozen vegetables and fruits that have no added juices or sauces.  Fresh meats are lower in sodium than processed meats, such as son, sausage, and hotdogs.  Read the nutrition label or ask your  to help you find a fresh meat that is low in sodium.  Eat less salt--at the table and when cooking.  A single teaspoon of table salt has 2,300 mg of sodium.  Leave the salt out of recipes for pasta, casseroles, and soups.  Ask your RDN how to cook your favorite recipes without sodium  Be a smart .  Look for food packages that say “salt-free” or “sodium-free.” These items contain less than 5 milligrams of sodium per serving.  “Very low-sodium” products contain less than 35 milligrams of sodium per serving.  “Low-sodium” products contain less than 140 milligrams of sodium per serving.   Beware of “reduced salt” or \"reduced sodium\" products.  These items may still be high in sodium. Check the nutrition label.   Add flavors to your food without adding sodium.  Try lemon juice, lime juice, fruit juice or vinegar.    Dry or fresh herbs add flavor. Try basil, bay leaf, dill, rosemary, parsley, darvin, dry mustard, nutmeg, thyme, and paprika.  Pepper, red pepper flakes, and cayenne pepper can add spice to your meals without adding sodium. Hot sauce contains sodium, but if you use just a drop or two, it will not add up to much.  Buy a sodium-free seasoning blend or make your own at home.     Additional Lifestyle Tips    Achieve and maintain a healthy weight.  Talk with your RDN or your doctor about what is a healthy weight for you.  Set goals to reach and maintain that weight.   To lose weight, reduce your calorie intake along with increasing your physical activity. A weight loss of 10 to 15 pounds could reduce LDL-cholesterol by 5 milligrams per deciliter.  Participate in physical activity.    Talk with your health care team to find out what types of " physical activity are best for you. Set a plan to get about 30 minutes of exercise on most days.          Nutrition Counseling  Our expert team offers:   Medical Nutrition Therapy for Chronic Conditions   Weight Management   Diabetes Education and Management   Wellness Services   Body Composition Measurements   Gastrointestinal Health    N[facility]ing Services are located at:  [address], [place] [address]  For more information and to schedule a consultation, please call [phone].  A physician referral may be required by your insurance for coverage.

## 2025-04-08 NOTE — ANESTHESIA PROCEDURE NOTES
Central Venous Line    Performed by: Stanislav Reilly M.D.  Authorized by: Stanislav Reilly M.D.    Start Time:  4/8/2025 7:46 AM  End Time:  4/8/2025 7:49 AM  Patient Location:  OR  Indication: central venous access and hemodynamic monitoring        provider hand hygiene performed prior to central venous catheter insertion, all 5 sterile barriers used (gloves, gown, cap, mask, large sterile drape) during central venous catheter insertion and skin prep agent completely dried prior to procedure    Patient Position:  Trendelenburg  Laterality:  Right  Site:  Internal jugular  Prep:  Chlorhexidine  Catheter Size:  7 Fr  Catheter Length (cm):  20  Number of Lumens:  Triple lumen  target vein identified, needle advanced into vein and blood aspirated and guidewire advanced into vein    Seldinger Technique?: Yes    Ultrasound-Guided: ultrasound-guided  Image captured, interpreted and electronically stored.  Sterile Gel and Probe Cover Used for Ultrasound?: Yes    Intravenous Verification: verified by ultrasound, venous blood return and chest x-ray pending    all ports aspirated, all ports flushed easily, guidewire was removed intact, biopatch was applied, line was sutured in place and dressing was applied    Events: patient tolerated procedure well with no complications    PA Catheter Placed?: No

## 2025-04-08 NOTE — DIETARY
Nutrition Services: Diet Education Consult   Day 0 of admit.  Martir Hoyos Jr. is a 64 y.o. male with admitting DX of Coronary artery disease involving coronary bypass graft of native heart, unspecified whether angina present [I25.810]    RD received referral for cardiac diet education. Dx list includes CAD, primary HTN. S/p LIMA-LAD CABG x1. Pt is currently intubated on vent support per flowsheets review; not appropriate for bedside teaching at this time. RD provided information via discharge instructions which includes nutrition recommendations and tips to support a heart healthy dietary pattern. This includes outpatient resources to [facility] affiliated [facility] Program for continued nutrition education and guidance as desired.     No other education needs identified at this time. Please re-consult RD for supplemental education or at the request of patient.    Please re-consult RD PRN

## 2025-04-08 NOTE — PROGRESS NOTES
Pt transported to floor and placed on ventilator and monitor  Pt unresponsive on small amount of norepi and precedex  Dr Reilly and Dr Patel at bedside

## 2025-04-08 NOTE — FLOWSHEET NOTE
04/08/25 1351   Weaning Parameters   RR (bpm) 13   $ FVC / Vital Capacity (liters)  2.4   NIF (cm H2O)  -40   Rapid Shallow Breathing Index (RR/VT) 6   Spontaneous VE 19.6   Spontaneous VT 1723

## 2025-04-09 ENCOUNTER — APPOINTMENT (OUTPATIENT)
Dept: RADIOLOGY | Facility: MEDICAL CENTER | Age: 65
DRG: 236 | End: 2025-04-09
Payer: COMMERCIAL

## 2025-04-09 LAB
ANION GAP SERPL CALC-SCNC: 11 MMOL/L (ref 7–16)
BUN SERPL-MCNC: 18 MG/DL (ref 8–22)
CA-I SERPL-SCNC: 1 MMOL/L (ref 1.1–1.3)
CALCIUM SERPL-MCNC: 8.1 MG/DL (ref 8.5–10.5)
CHLORIDE SERPL-SCNC: 105 MMOL/L (ref 96–112)
CO2 SERPL-SCNC: 19 MMOL/L (ref 20–33)
CREAT SERPL-MCNC: 0.83 MG/DL (ref 0.5–1.4)
EKG IMPRESSION: NORMAL
EKG IMPRESSION: NORMAL
ERYTHROCYTE [DISTWIDTH] IN BLOOD BY AUTOMATED COUNT: 41.2 FL (ref 35.9–50)
GFR SERPLBLD CREATININE-BSD FMLA CKD-EPI: 98 ML/MIN/1.73 M 2
GLUCOSE BLD STRIP.AUTO-MCNC: 117 MG/DL (ref 65–99)
GLUCOSE BLD STRIP.AUTO-MCNC: 123 MG/DL (ref 65–99)
GLUCOSE BLD STRIP.AUTO-MCNC: 127 MG/DL (ref 65–99)
GLUCOSE BLD STRIP.AUTO-MCNC: 135 MG/DL (ref 65–99)
GLUCOSE BLD STRIP.AUTO-MCNC: 139 MG/DL (ref 65–99)
GLUCOSE BLD STRIP.AUTO-MCNC: 142 MG/DL (ref 65–99)
GLUCOSE BLD STRIP.AUTO-MCNC: 145 MG/DL (ref 65–99)
GLUCOSE BLD STRIP.AUTO-MCNC: 149 MG/DL (ref 65–99)
GLUCOSE BLD STRIP.AUTO-MCNC: 162 MG/DL (ref 65–99)
GLUCOSE BLD STRIP.AUTO-MCNC: 172 MG/DL (ref 65–99)
GLUCOSE SERPL-MCNC: 154 MG/DL (ref 65–99)
HCT VFR BLD AUTO: 40.7 % (ref 42–52)
HGB BLD-MCNC: 14.1 G/DL (ref 14–18)
MCH RBC QN AUTO: 30.1 PG (ref 27–33)
MCHC RBC AUTO-ENTMCNC: 34.6 G/DL (ref 32.3–36.5)
MCV RBC AUTO: 86.8 FL (ref 81.4–97.8)
PLATELET # BLD AUTO: 204 K/UL (ref 164–446)
PMV BLD AUTO: 10.6 FL (ref 9–12.9)
POTASSIUM SERPL-SCNC: 4.1 MMOL/L (ref 3.6–5.5)
POTASSIUM SERPL-SCNC: 4.3 MMOL/L (ref 3.6–5.5)
RBC # BLD AUTO: 4.69 M/UL (ref 4.7–6.1)
SODIUM SERPL-SCNC: 135 MMOL/L (ref 135–145)
WBC # BLD AUTO: 17.3 K/UL (ref 4.8–10.8)

## 2025-04-09 PROCEDURE — 700102 HCHG RX REV CODE 250 W/ 637 OVERRIDE(OP): Performed by: NURSE PRACTITIONER

## 2025-04-09 PROCEDURE — 93010 ELECTROCARDIOGRAM REPORT: CPT | Mod: 76 | Performed by: INTERNAL MEDICINE

## 2025-04-09 PROCEDURE — A9270 NON-COVERED ITEM OR SERVICE: HCPCS | Performed by: THORACIC SURGERY (CARDIOTHORACIC VASCULAR SURGERY)

## 2025-04-09 PROCEDURE — 82962 GLUCOSE BLOOD TEST: CPT | Mod: 91

## 2025-04-09 PROCEDURE — 99291 CRITICAL CARE FIRST HOUR: CPT | Performed by: EMERGENCY MEDICINE

## 2025-04-09 PROCEDURE — 82330 ASSAY OF CALCIUM: CPT

## 2025-04-09 PROCEDURE — 770022 HCHG ROOM/CARE - ICU (200)

## 2025-04-09 PROCEDURE — 85027 COMPLETE CBC AUTOMATED: CPT

## 2025-04-09 PROCEDURE — 700102 HCHG RX REV CODE 250 W/ 637 OVERRIDE(OP): Performed by: EMERGENCY MEDICINE

## 2025-04-09 PROCEDURE — C9248 INJ, CLEVIDIPINE BUTYRATE: HCPCS | Mod: JZ

## 2025-04-09 PROCEDURE — 700102 HCHG RX REV CODE 250 W/ 637 OVERRIDE(OP)

## 2025-04-09 PROCEDURE — 71045 X-RAY EXAM CHEST 1 VIEW: CPT

## 2025-04-09 PROCEDURE — 700111 HCHG RX REV CODE 636 W/ 250 OVERRIDE (IP): Mod: JZ | Performed by: NURSE PRACTITIONER

## 2025-04-09 PROCEDURE — 700102 HCHG RX REV CODE 250 W/ 637 OVERRIDE(OP): Performed by: THORACIC SURGERY (CARDIOTHORACIC VASCULAR SURGERY)

## 2025-04-09 PROCEDURE — 36415 COLL VENOUS BLD VENIPUNCTURE: CPT

## 2025-04-09 PROCEDURE — A9270 NON-COVERED ITEM OR SERVICE: HCPCS | Performed by: NURSE PRACTITIONER

## 2025-04-09 PROCEDURE — 94669 MECHANICAL CHEST WALL OSCILL: CPT

## 2025-04-09 PROCEDURE — 84132 ASSAY OF SERUM POTASSIUM: CPT

## 2025-04-09 PROCEDURE — 80048 BASIC METABOLIC PNL TOTAL CA: CPT

## 2025-04-09 PROCEDURE — A9270 NON-COVERED ITEM OR SERVICE: HCPCS | Performed by: EMERGENCY MEDICINE

## 2025-04-09 PROCEDURE — A9270 NON-COVERED ITEM OR SERVICE: HCPCS

## 2025-04-09 PROCEDURE — 99024 POSTOP FOLLOW-UP VISIT: CPT | Performed by: NURSE PRACTITIONER

## 2025-04-09 PROCEDURE — 93005 ELECTROCARDIOGRAM TRACING: CPT | Mod: TC

## 2025-04-09 PROCEDURE — 700111 HCHG RX REV CODE 636 W/ 250 OVERRIDE (IP): Mod: JZ | Performed by: THORACIC SURGERY (CARDIOTHORACIC VASCULAR SURGERY)

## 2025-04-09 PROCEDURE — 700111 HCHG RX REV CODE 636 W/ 250 OVERRIDE (IP): Mod: JZ

## 2025-04-09 RX ORDER — FUROSEMIDE 10 MG/ML
40 INJECTION INTRAMUSCULAR; INTRAVENOUS 2 TIMES DAILY
Status: DISCONTINUED | OUTPATIENT
Start: 2025-04-09 | End: 2025-04-10

## 2025-04-09 RX ORDER — POTASSIUM CHLORIDE 7.45 MG/ML
10 INJECTION INTRAVENOUS ONCE
Status: COMPLETED | OUTPATIENT
Start: 2025-04-09 | End: 2025-04-09

## 2025-04-09 RX ORDER — AMLODIPINE BESYLATE 10 MG/1
10 TABLET ORAL
Status: DISCONTINUED | OUTPATIENT
Start: 2025-04-10 | End: 2025-04-10

## 2025-04-09 RX ORDER — FUROSEMIDE 10 MG/ML
40 INJECTION INTRAMUSCULAR; INTRAVENOUS DAILY
Status: DISCONTINUED | OUTPATIENT
Start: 2025-04-09 | End: 2025-04-09

## 2025-04-09 RX ORDER — AMLODIPINE BESYLATE 10 MG/1
5 TABLET ORAL ONCE
Status: COMPLETED | OUTPATIENT
Start: 2025-04-09 | End: 2025-04-09

## 2025-04-09 RX ORDER — POTASSIUM CHLORIDE 1500 MG/1
20 TABLET, EXTENDED RELEASE ORAL 2 TIMES DAILY
Status: DISCONTINUED | OUTPATIENT
Start: 2025-04-09 | End: 2025-04-10

## 2025-04-09 RX ORDER — METOLAZONE 5 MG/1
2.5 TABLET ORAL
Status: DISCONTINUED | OUTPATIENT
Start: 2025-04-09 | End: 2025-04-10

## 2025-04-09 RX ORDER — POTASSIUM CHLORIDE 1500 MG/1
20 TABLET, EXTENDED RELEASE ORAL DAILY
Status: DISCONTINUED | OUTPATIENT
Start: 2025-04-09 | End: 2025-04-09

## 2025-04-09 RX ORDER — AMLODIPINE BESYLATE 10 MG/1
5 TABLET ORAL
Status: DISCONTINUED | OUTPATIENT
Start: 2025-04-09 | End: 2025-04-09

## 2025-04-09 RX ORDER — DEXTROSE MONOHYDRATE 25 G/50ML
25 INJECTION, SOLUTION INTRAVENOUS
Status: DISCONTINUED | OUTPATIENT
Start: 2025-04-09 | End: 2025-04-10

## 2025-04-09 RX ORDER — INSULIN LISPRO 100 [IU]/ML
2-9 INJECTION, SOLUTION INTRAVENOUS; SUBCUTANEOUS
Status: DISCONTINUED | OUTPATIENT
Start: 2025-04-09 | End: 2025-04-10

## 2025-04-09 RX ORDER — IRBESARTAN 150 MG/1
300 TABLET ORAL DAILY
Status: DISCONTINUED | OUTPATIENT
Start: 2025-04-09 | End: 2025-04-10

## 2025-04-09 RX ORDER — METOPROLOL TARTRATE 25 MG/1
12.5 TABLET, FILM COATED ORAL ONCE
Status: COMPLETED | OUTPATIENT
Start: 2025-04-09 | End: 2025-04-09

## 2025-04-09 RX ORDER — HYDRALAZINE HYDROCHLORIDE 20 MG/ML
20 INJECTION INTRAMUSCULAR; INTRAVENOUS EVERY 6 HOURS PRN
Status: DISCONTINUED | OUTPATIENT
Start: 2025-04-09 | End: 2025-04-12 | Stop reason: HOSPADM

## 2025-04-09 RX ADMIN — CLEVIPIDINE 21 MG/HR: 0.5 EMULSION INTRAVENOUS at 12:40

## 2025-04-09 RX ADMIN — CLEVIPIDINE 21 MG/HR: 0.5 EMULSION INTRAVENOUS at 13:55

## 2025-04-09 RX ADMIN — TAMSULOSIN HYDROCHLORIDE 0.4 MG: 0.4 CAPSULE ORAL at 20:42

## 2025-04-09 RX ADMIN — INSULIN LISPRO 4 UNITS: 100 INJECTION, SOLUTION INTRAVENOUS; SUBCUTANEOUS at 08:23

## 2025-04-09 RX ADMIN — POLYETHYLENE GLYCOL 3350 1 PACKET: 17 POWDER, FOR SOLUTION ORAL at 05:34

## 2025-04-09 RX ADMIN — POTASSIUM CHLORIDE 10 MEQ: 10 INJECTION, SOLUTION INTRAVENOUS at 07:04

## 2025-04-09 RX ADMIN — METOPROLOL TARTRATE 12.5 MG: 25 TABLET, FILM COATED ORAL at 05:35

## 2025-04-09 RX ADMIN — OXYCODONE HYDROCHLORIDE 10 MG: 10 TABLET ORAL at 15:42

## 2025-04-09 RX ADMIN — METOLAZONE 2.5 MG: 5 TABLET ORAL at 12:01

## 2025-04-09 RX ADMIN — ROSUVASTATIN CALCIUM 20 MG: 20 TABLET, FILM COATED ORAL at 17:37

## 2025-04-09 RX ADMIN — SENNOSIDES AND DOCUSATE SODIUM 2 TABLET: 50; 8.6 TABLET ORAL at 17:35

## 2025-04-09 RX ADMIN — FUROSEMIDE 40 MG: 10 INJECTION, SOLUTION INTRAVENOUS at 06:58

## 2025-04-09 RX ADMIN — SENNOSIDES AND DOCUSATE SODIUM 2 TABLET: 50; 8.6 TABLET ORAL at 05:35

## 2025-04-09 RX ADMIN — HYDRALAZINE HYDROCHLORIDE 20 MG: 20 INJECTION, SOLUTION INTRAMUSCULAR; INTRAVENOUS at 13:17

## 2025-04-09 RX ADMIN — ACETAMINOPHEN 1000 MG: 500 TABLET, FILM COATED ORAL at 17:35

## 2025-04-09 RX ADMIN — CLEVIPIDINE 21 MG/HR: 0.5 EMULSION INTRAVENOUS at 06:15

## 2025-04-09 RX ADMIN — MAGNESIUM SULFATE IN DEXTROSE 1 G: 10 INJECTION, SOLUTION INTRAVENOUS at 05:58

## 2025-04-09 RX ADMIN — CLEVIPIDINE 21 MG/HR: 0.5 EMULSION INTRAVENOUS at 07:39

## 2025-04-09 RX ADMIN — IRBESARTAN 300 MG: 150 TABLET ORAL at 09:44

## 2025-04-09 RX ADMIN — CLEVIPIDINE 21 MG/HR: 0.5 EMULSION INTRAVENOUS at 08:54

## 2025-04-09 RX ADMIN — OXYCODONE HYDROCHLORIDE 10 MG: 10 TABLET ORAL at 12:00

## 2025-04-09 RX ADMIN — CLEVIPIDINE 10 MG/HR: 0.5 EMULSION INTRAVENOUS at 04:17

## 2025-04-09 RX ADMIN — CLEVIPIDINE 21 MG/HR: 0.5 EMULSION INTRAVENOUS at 11:28

## 2025-04-09 RX ADMIN — POTASSIUM CHLORIDE 20 MEQ: 1500 TABLET, EXTENDED RELEASE ORAL at 17:36

## 2025-04-09 RX ADMIN — TRAMADOL HYDROCHLORIDE 50 MG: 50 TABLET, COATED ORAL at 13:21

## 2025-04-09 RX ADMIN — INSULIN LISPRO 2 UNITS: 100 INJECTION, SOLUTION INTRAVENOUS; SUBCUTANEOUS at 12:11

## 2025-04-09 RX ADMIN — OXYCODONE HYDROCHLORIDE 10 MG: 10 TABLET ORAL at 20:41

## 2025-04-09 RX ADMIN — Medication 1 APPLICATOR: at 07:47

## 2025-04-09 RX ADMIN — OXYCODONE HYDROCHLORIDE 10 MG: 10 TABLET ORAL at 04:29

## 2025-04-09 RX ADMIN — FUROSEMIDE 40 MG: 10 INJECTION, SOLUTION INTRAVENOUS at 17:36

## 2025-04-09 RX ADMIN — OMEPRAZOLE 20 MG: 20 CAPSULE, DELAYED RELEASE ORAL at 05:35

## 2025-04-09 RX ADMIN — Medication 1 APPLICATOR: at 20:42

## 2025-04-09 RX ADMIN — AMLODIPINE BESYLATE 5 MG: 10 TABLET ORAL at 06:58

## 2025-04-09 RX ADMIN — CLEVIPIDINE 21 MG/HR: 0.5 EMULSION INTRAVENOUS at 10:06

## 2025-04-09 RX ADMIN — ASPIRIN 81 MG: 81 TABLET, COATED ORAL at 05:35

## 2025-04-09 RX ADMIN — METOPROLOL TARTRATE 12.5 MG: 25 TABLET, FILM COATED ORAL at 12:01

## 2025-04-09 RX ADMIN — TRAMADOL HYDROCHLORIDE 50 MG: 50 TABLET, COATED ORAL at 09:43

## 2025-04-09 RX ADMIN — CLOPIDOGREL BISULFATE 75 MG: 75 TABLET, FILM COATED ORAL at 05:35

## 2025-04-09 RX ADMIN — POTASSIUM CHLORIDE 10 MEQ: 10 INJECTION, SOLUTION INTRAVENOUS at 00:02

## 2025-04-09 RX ADMIN — OXYCODONE HYDROCHLORIDE 10 MG: 10 TABLET ORAL at 07:45

## 2025-04-09 RX ADMIN — ACETAMINOPHEN 1000 MG: 500 TABLET, FILM COATED ORAL at 05:35

## 2025-04-09 RX ADMIN — ACETAMINOPHEN 1000 MG: 500 TABLET, FILM COATED ORAL at 11:59

## 2025-04-09 RX ADMIN — ACETAMINOPHEN 1000 MG: 500 TABLET, FILM COATED ORAL at 00:00

## 2025-04-09 RX ADMIN — AMLODIPINE BESYLATE 5 MG: 10 TABLET ORAL at 08:16

## 2025-04-09 RX ADMIN — OXYCODONE HYDROCHLORIDE 10 MG: 10 TABLET ORAL at 00:00

## 2025-04-09 ASSESSMENT — PAIN DESCRIPTION - PAIN TYPE
TYPE: ACUTE PAIN;SURGICAL PAIN
TYPE: ACUTE PAIN
TYPE: ACUTE PAIN;SURGICAL PAIN
TYPE: ACUTE PAIN
TYPE: ACUTE PAIN;SURGICAL PAIN

## 2025-04-09 ASSESSMENT — COGNITIVE AND FUNCTIONAL STATUS - GENERAL
CLIMB 3 TO 5 STEPS WITH RAILING: A LITTLE
SUGGESTED CMS G CODE MODIFIER DAILY ACTIVITY: CH
MOBILITY SCORE: 22
SUGGESTED CMS G CODE MODIFIER MOBILITY: CJ
WALKING IN HOSPITAL ROOM: A LITTLE
DAILY ACTIVITIY SCORE: 24

## 2025-04-09 ASSESSMENT — FIBROSIS 4 INDEX: FIB4 SCORE: 1.6

## 2025-04-09 NOTE — FLOWSHEET NOTE
04/09/25 1412   Incentive Spirometry Treatment   Incentive Spirometer Volume 2500 mL   Chest Physiotherapy Treatment   $ PEP/CPT Performed PEP / Flutter     QID

## 2025-04-09 NOTE — PROGRESS NOTES
Cardiovascular Surgery Progress Note    Name: Martir Hoyos Jr.  MRN: [id number]  : 1960  Admit Date: 2025  5:11 AM  1 Day Post-Op     Procedure:  Procedure(s) and Anesthesia Type:     * CORONARY BYPASS GRAFTING X1 - General     * ECHOCARDIOGRAM, TRANSESOPHAGEAL, INTRAOPERATIVE - General    Vitals:  Vitals:    25 0615 25 0630 25 0645 25 0701   BP: 127/65      Pulse: 77 75 73 75   Resp: 12 (!) 8 15 (!) 22   Temp:       TempSrc:       SpO2: 96% 96% 95% 93%   Weight:       Height:          Temp (24hrs), Av.9 °C (98.5 °F), Min:36 °C (96.8 °F), Max:37.5 °C (99.5 °F)      Respiratory:  Vent Mode: Spont, PEEP/CPAP: 8, PIP: 15, MAP: 8.9 Respiration: (!) 22, Pulse Oximetry: 93 %       Fluids:    Intake/Output Summary (Last 24 hours) at 2025 0739  Last data filed at 2025 0604  Gross per 24 hour   Intake 5715.87 ml   Output 3524 ml   Net 2191.87 ml     Admit weight: Weight: 122 kg (269 lb 6.4 oz)  Current weight: Weight: 124 kg (272 lb 7.8 oz) (25 0500)    Labs:  Recent Labs     25  0939 25  1120 25  0400   WBC 6.2  --  17.3*   RBC 5.43  --  4.69*   HEMOGLOBIN 15.9 14.2 14.1   HEMATOCRIT 47.7 41.4* 40.7*   MCV 87.8  --  86.8   MCH 29.3  --  30.1   MCHC 33.3  --  34.6   RDW 41.6  --  41.2   PLATELETCT 284 228 204   MPV 10.5  --  10.6     Recent Labs     25  0939 25  1120 25  2305 25  0400 25  0555   SODIUM 137  --   --  135  --    POTASSIUM 4.4   < > 4.2 4.3 4.1   CHLORIDE 105  --   --  105  --    CO2 22  --   --  19*  --    GLUCOSE 106*  --   --  154*  --    BUN 14  --   --  18  --    CREATININE 0.95  --   --  0.83  --    CALCIUM 9.0  --   --  8.1*  --     < > = values in this interval not displayed.     Recent Labs     25  0939 25  1120   APTT 26.0 27.4   INR 0.99 1.20*       HgbA1c:  5.6    Diabetic Educator Consult:  N/A    Medications:  Scheduled Medications   Medication Dose Frequency    amLODIPine  5 mg  Q DAY    furosemide  40 mg DAILY    potassium chloride SA  20 mEq DAILY    potassium chloride  10 mEq Once    rosuvastatin  20 mg Q EVENING    Nozin nasal  swab  1 Applicator BID    magnesium sulfate  1 g DAILY    K+ Scale: Goal of 4.5  1 Each Q6HRS    metoprolol tartrate  12.5 mg BID    Followed by    [START ON 4/10/2025] metoprolol tartrate  25 mg BID    aspirin  81 mg DAILY    clopidogrel  75 mg DAILY    Pharmacy Consult Request  1 Each PHARMACY TO DOSE    acetaminophen  1,000 mg Q6HRS    senna-docusate  2 Tablet BID    And    polyethylene glycol/lytes  1 Packet DAILY    And    [START ON 4/10/2025] magnesium hydroxide  30 mL DAILY    omeprazole  20 mg DAILY    insulin regular  0-14 Units Once    insulin lispro  0-14 Units TID AC    tamsulosin  0.4 mg Nightly        Exam:   Physical Exam  Vitals and nursing note reviewed.   Constitutional:       General: He is not in acute distress.     Appearance: Normal appearance.   HENT:      Head: Normocephalic.      Right Ear: External ear normal.      Left Ear: External ear normal.      Nose: Nose normal. No congestion.      Mouth/Throat:      Mouth: Mucous membranes are moist.      Pharynx: Oropharynx is clear.   Eyes:      Extraocular Movements: Extraocular movements intact.      Pupils: Pupils are equal, round, and reactive to light.   Cardiovascular:      Rate and Rhythm: Normal rate and regular rhythm.      Pulses: Normal pulses.      Heart sounds: Normal heart sounds.   Pulmonary:      Effort: Pulmonary effort is normal.      Breath sounds: Decreased breath sounds present.   Abdominal:      General: Bowel sounds are decreased. There is no distension.      Palpations: Abdomen is soft.   Musculoskeletal:      Cervical back: Normal range of motion and neck supple. No tenderness.      Right lower leg: Edema present.      Left lower leg: Edema present.   Skin:     General: Skin is warm and dry.      Comments: Midline surgical incision   Neurological:      General:  No focal deficit present.      Mental Status: He is alert and oriented to person, place, and time. Mental status is at baseline.   Psychiatric:         Mood and Affect: Mood normal.         Behavior: Behavior normal.         Thought Content: Thought content normal.         Cardiac Medications:    ASA - Yes    Plavix - Yes    Post-operative Beta Blockers - Yes    Ace/ARB- No; contraindicated because of Normal EF    ARNI-  No; contraindicated because of Normal EF    Statin - Yes    Aldactone- No; contraindicated because of Normal EF    SGLT2i-  No; contraindicated because of Normal EF    Ejection Fraction:  65%    Telemetry:   4/9 SR    Assessment/Plan:  POD 1  on clevi, SR, neuro intact, wounds intact, abdomen soft, fluid balance positive, wt up,  room air.  Plan:  Keep chest tubes and pacing wires. Diurese. Start amlodipine, wean clevi. IS/ambulate.     Disposition:  TBD

## 2025-04-09 NOTE — PROGRESS NOTES
Critical Care Progress Note    Date of admission  4/8/2025    Hospital Course  64 y.o. male with history of CAD, hypertension, hyperlipidemia, BPH, who presented with chest pain, orthopnea, and syncope.  He was found to have 100% LAD occlusion with reconstitution from collaterals from the circumflex on cath in 12/2024.     Underwent LIMA-LAD CABG with Dr. Buckley.    Operative course was uneventful  Pre/post echo notable for normal   Arrives on norepi 0.05  CPB: 31min, XC: 19min  V wires functioning with threshold 2mA  Blood products: n/a    Interval Problem Update  Events in last 24 hours: 1v CABG, extubated uneventfully, hypertensive  N: awake, sitting up in bed  CV: hypertensive, on clevidipine, amlodipine  R: 1L NC  FEN/GI: diet  /Renal: Cr 0.8, stable  I/O net +2.2L over last 24 hours.  250mL from chest tube.  Heme: hemoglobin 14 from 16 pre-op. Platelets 204K from 280K  ID: WBC 17K, no active infectious issues  Skin/MSK: n/a  Endo: insulin drip --> SSI     Review of Systems  ROS     Vital Signs for last 24 hours   Temp:  [36 °C (96.8 °F)-37.5 °C (99.5 °F)] 36.3 °C (97.3 °F)  Pulse:  [63-93] 77  Resp:  [7-35] 12  BP: (104-128)/(56-91) 127/65  SpO2:  [91 %-100 %] 96 %    Hemodynamic parameters for last 24 hours  CVP:  [-9 MM HG-327 MM HG] 189 MM HG    Respiratory Information for the last 24 hours  Vent Mode: Spont  PEEP/CPAP: 8  P Support (PS + PEEP): 5  MAP: 8.9  Control VTE (exp VT): 860    Physical Exam   Physical Exam  Vitals reviewed.   Constitutional:       General: He is not in acute distress.     Comments: Sitting up in chair   Cardiovascular:      Rate and Rhythm: Normal rate and regular rhythm.      Comments: V wires in place    Chest tubes with serosanguinous drainage  Pulmonary:      Effort: No respiratory distress.   Abdominal:      General: There is no distension.   Neurological:      Comments: Pupils equal and reactive         Medications  Current Facility-Administered Medications    Medication Dose Route Frequency Provider Last Rate Last Admin    amLODIPine (Norvasc) tablet 5 mg  5 mg Oral Q DAY Efren Corral P.A.-C.        furosemide (Lasix) injection 40 mg  40 mg Intravenous DAILY Efren Corral P.A.-C.        potassium chloride SA (Kdur) tablet 20 mEq  20 mEq Oral DAILY STEPHANIE Dela CruzCSofia        potassium chloride (KCL) ivpb 10 mEq  10 mEq Intravenous Once Bertram S Knackstedt, D.O.        EPINEPHrine (Adrenalin) infusion 4 mg/250 mL (premix)  0-0.5 mcg/kg/min (Ideal) Intravenous Continuous Bertram S Knackstedt, D.O.        norepinephrine (Levophed) 8 mg in 250 mL NS infusion (premix)  0-1 mcg/kg/min (Ideal) Intravenous Continuous Bertram S Knackstedt, D.O.   Stopped at 04/08/25 1651    rosuvastatin (Crestor) tablet 20 mg  20 mg Oral Q EVENING Edwin Patel M.D.   20 mg at 04/08/25 1733    Respiratory Therapy Consult   Nebulization Continuous RT CUCA Dela Cruz.-CSofia        NS infusion   Intravenous Continuous NYLA Dela Cruz-LUZ 10 mL/hr at 04/08/25 1153 Associate Infusion Pump at 04/08/25 1153    NS infusion   Intravenous Continuous NYLA Dela Cruz-C. 30 mL/hr at 04/08/25 1151 Associate Infusion Pump at 04/08/25 1151    Nozin nasal  swab  1 Applicator Each Nostril BID Efren Corral P.A.-C.   1 Applicator at 04/08/25 2003    calcium CHLORIDE 10 % 1,000 mg in dextrose 5% 100 mL IVPB  1,000 mg Intravenous Once PRN NYLA Dela Cruz-CSofia        magnesium sulfate in D5W IVPB premix 1 g  1 g Intravenous DAILY Efren Corral P.A.-C. 100 mL/hr at 04/09/25 0558 1 g at 04/09/25 0558    K+ Scale: Goal of 4.5  1 Each Intravenous Q6HRS NYLA Dela Cruz-C.   1 Each at 04/09/25 0600    metoprolol tartrate (Lopressor) tablet 12.5 mg  12.5 mg Oral BID Efren Corral P.A.-C.   12.5 mg at 04/09/25 0535    Followed by    [START ON 4/10/2025] metoprolol tartrate (Lopressor) tablet 25 mg  25 mg Oral BID Efren Corral P.A.-C.        aspirin EC tablet 81 mg  81 mg Oral DAILY Efren Corral,  P.A.-C.   81 mg at 04/09/25 0535    clopidogrel (Plavix) tablet 75 mg  75 mg Oral DAILY STEPHANIE Dela CruzCSofia   75 mg at 04/09/25 0535    clevidipine (Cleviprex) IV emulsion  0-21 mg/hr Intravenous Continuous Efren Corral P.A.-C. 42 mL/hr at 04/09/25 0615 21 mg/hr at 04/09/25 0615    nitroglycerin 50 mg in D5W 250 ml infusion  0-100 mcg/min Intravenous Continuous KELECHI Dela CruzA.-LUZ        Pharmacy Consult Request ...Pain Management Review 1 Each  1 Each Other PHARMACY TO DOSE Efren Corral P.A.-C.        acetaminophen (Tylenol) tablet 1,000 mg  1,000 mg Oral Q6HRS CUCA Dela Cruz.-CHERRIE.   1,000 mg at 04/09/25 0535    Followed by    [START ON 4/18/2025] acetaminophen (Tylenol) tablet 1,000 mg  1,000 mg Oral Q6HRS PRN KELECHI Dela CruzASofia-C.        oxyCODONE immediate-release (Roxicodone) tablet 5 mg  5 mg Oral Q3HRS PRN CUCA Dela Cruz.-C.        Or    oxyCODONE immediate release (Roxicodone) tablet 10 mg  10 mg Oral Q3HRS PRN KELECHI Dela CruzA.-C.   10 mg at 04/09/25 0429    Or    fentaNYL (Sublimaze) injection 50 mcg  50 mcg Intravenous Q3HRS PRN KELECHI Dela CruzA.-C.        traMADol (Ultram) 50 MG tablet 50 mg  50 mg Oral Q4HRS PRN KELECHI Dela CruzA.-C.   50 mg at 04/08/25 1734    midazolam (Versed) injection 2 mg  2 mg Intravenous Q HOUR PRN KELECHI Dela CruzASofia-C.        dexmedetomidine (Precedex) 400 mcg/100mL infusion  0-1.5 mcg/kg/hr (Ideal) Intravenous Continuous STEPHANIE Dela CruzC.   Stopped at 04/08/25 1215    sodium bicarbonate 8.4 % injection 50 mEq  50 mEq Intravenous Q HOUR PRN Efren Corral P.A.-C.        morphine 4 MG/ML injection 4 mg  4 mg Intravenous Q HOUR PRN Efren Corral P.A.-C.        ondansetron (Zofran) syringe/vial injection 8 mg  8 mg Intravenous Q6HRS PRN MARION Dela Cruz.A.-C.   8 mg at 04/08/25 1616    Or    prochlorperazine (Compazine) injection 10 mg  10 mg Intravenous Q6HRS PRN Efren Corral P.A.-C.        acetaminophen (Tylenol) tablet 650 mg  650 mg Oral Q4HRS PRN Efren Corral,  MARION.MERLYN-CSofia        Or    acetaminophen (Tylenol) suppository 650 mg  650 mg Rectal Q4HRS PRN Efren Corral P.A.-C.        senna-docusate (Pericolace Or Senokot S) 8.6-50 MG per tablet 2 Tablet  2 Tablet Oral BID Efren Corral P.A.-C.   2 Tablet at 04/09/25 0535    And    polyethylene glycol/lytes (Miralax) Packet 1 Packet  1 Packet Oral DAILY Efren Corral P.A.-C.   1 Packet at 04/09/25 0534    And    [START ON 4/10/2025] magnesium hydroxide (Milk Of Magnesia) suspension 30 mL  30 mL Oral DAILY Efren Corral P.A.-C.        And    bisacodyl (Dulcolax) suppository 10 mg  10 mg Rectal QDAY PRN Efren Corral P.A.-C.        omeprazole (PriLOSEC) capsule 20 mg  20 mg Oral DAILY Efren Corral P.A.-C.   20 mg at 04/09/25 0535    mag hydrox-al hydrox-simeth (Maalox Plus Es Or Mylanta Ds) suspension 30 mL  30 mL Oral Q4HRS PRN Efren Corral P.A.-C.        diphenhydrAMINE (Benadryl) tablet/capsule 25 mg  25 mg Oral HS PRN - MR X 1 Efren Corral P.A.-C.        electrolyte-A (Plasmalyte-A) infusion   Intravenous PRN Efren Corral P.A.-C. 999 mL/hr at 04/08/25 1507 New Bag at 04/08/25 1507    insulin regular (HumuLIN R/NovoLIN R) 1 unit/mL syringe (IV ONLY) 0-14 Units  0-14 Units Intravenous Once Bertram Buckley, D.OSofia        insulin lispro (HumaLOG,AdmeLOG) subcutaneous injection  0-14 Units Subcutaneous TID AC Bertram Buckley, D.OSofia        insulin regular (HumuLIN R/NovoLIN R) 100 Units in  mL infusion premix  0-29 Units/hr Intravenous Continuous Bertram Buckley D.OSofia   Paused at 04/09/25 0100    dextrose 50 % (D50W) injection 12.5-25 g  12.5-25 g Intravenous PRN Berrtam Buckley D.O.        MD Alert...Pharmacy to initiate transition from insulin infusion to subcutaneous insulin for cardiothoracic surgery 1 Each  1 Each Other Continuous Bertram Buckley D.O.        tamsulosin (Flomax) capsule 0.4 mg  0.4 mg Oral Nightly Bertram Buckley D.O.   0.4 mg at 04/08/25 2003       Fluids    Intake/Output  Summary (Last 24 hours) at 4/9/2025 0655  Last data filed at 4/9/2025 0604  Gross per 24 hour   Intake 5715.87 ml   Output 3524 ml   Net 2191.87 ml       Laboratory  Recent Labs     04/08/25  1120 04/08/25  1212 04/08/25  1356   ISTATAPH 7.394 7.461* 7.537*   ISTATAPCO2 35.6 27.7* 20.0*   ISTATAPO2 178* 80* 88   ISTATATCO2 23* 21* 18*   [id number] 100* 97 98   ISTATARTHCO3 21.7 19.8* 17.0*   ISTATARTBE -3 -3 -3   ISTATTEMP 36.1 C 36.1 C 37.1 C   ISTATFIO2 100 60 40   ISTATSPEC Arterial Arterial Arterial   ISTATAPHTC 7.407 7.475* 7.535*   [id number] 173* 75* 89         Recent Labs     04/07/25  0939 04/08/25  1120 04/08/25  1704 04/08/25  2305 04/09/25  0400 04/09/25  0555   SODIUM 137  --   --   --  135  --    POTASSIUM 4.4 4.7   < > 4.2 4.3 4.1   CHLORIDE 105  --   --   --  105  --    CO2 22  --   --   --  19*  --    BUN 14  --   --   --  18  --    CREATININE 0.95  --   --   --  0.83  --    MAGNESIUM  --  2.5  --   --   --   --    CALCIUM 9.0  --   --   --  8.1*  --     < > = values in this interval not displayed.     Recent Labs     04/07/25 0939 04/09/25  0400   ALTSGPT 48  --    ASTSGOT 36  --    ALKPHOSPHAT 66  --    TBILIRUBIN 0.6  --    GLUCOSE 106* 154*     Recent Labs     04/07/25 0939 04/09/25  0400   WBC 6.2 17.3*   NEUTSPOLYS 63.60  --    LYMPHOCYTES 23.30  --    MONOCYTES 7.90  --    EOSINOPHILS 4.00  --    BASOPHILS 1.00  --    ASTSGOT 36  --    ALTSGPT 48  --    ALKPHOSPHAT 66  --    TBILIRUBIN 0.6  --      Recent Labs     04/07/25  0939 04/08/25  1120 04/09/25  0400   RBC 5.43  --  4.69*   HEMOGLOBIN 15.9 14.2 14.1   HEMATOCRIT 47.7 41.4* 40.7*   PLATELETCT 284 228 204   PROTHROMBTM 13.1 15.3*  --    APTT 26.0 27.4  --    INR 0.99 1.20*  --      Assessment/Plan  * Coronary artery disease  Assessment & Plan  100% LAD occlusion with collateral reconstitution from the circumflex.   Underwent GRIER-LAD with Dr. Buckley.  Operative course was uneventful.  Post-CPB echo notable for normal BiV  function, trace MR. Risk for vasoplegia with pre-op ARB.  Patient arrived to the ICU intubated and accompanied by anesthesia.  Monitor hemodynamics, chest tube output, urine output, and hemoglobin    Titrate clevi for SBP<140  Reintroduce home antihypertensives  Discussed with Dr. Buckley.     Primary hypertension  Assessment & Plan  Recommend resuming irbesartan and HCTZ    Encounter for weaning from ventilator (HCC)  Assessment & Plan  Post cardiac surgery ventilator management for acute postoperative respiratory insufficiency due to sternotomy, anesthesia, sedative, narcotic, and decreased mobility. Extubted POD 0 uneventfully.  O2 for SpO2 92-96%  Incentive spirometry  Increase activity; up to chair as tolerated.      BPH (benign prostatic hyperplasia)  Assessment & Plan  Hold home tamsulosin    Osteoarthritis of right hip- (present on admission)  Assessment & Plan  Hold glucosamine and celecoxib         VTE:  Lovenox  Ulcer: Not Indicated  Lines: Central Line  Ongoing indication addressed, Arterial Line  Ongoing indication addressed, and Gatica Catheter  Ongoing indication addressed    Critical care time: 55 minutes, excluding procedure

## 2025-04-09 NOTE — ANESTHESIA POSTPROCEDURE EVALUATION
Patient: Martir Hoyos Jr.    Procedure Summary       Date: 04/08/25 Room / Location: Critical access hospital OR 03 / SURGERY [facility]    Anesthesia Start: 0730 Anesthesia Stop: 1117    Procedures:       CORONARY BYPASS GRAFTING X1 (Chest)      ECHOCARDIOGRAM, TRANSESOPHAGEAL, INTRAOPERATIVE (Mouth) Diagnosis: (CORONARY ARTERY DISEASE)    Surgeons: Bertram Buckley D.O. Responsible Provider: Stanislav Reilly M.D.    Anesthesia Type: general ASA Status: 4            Final Anesthesia Type: general  Last vitals  BP   Blood Pressure: (!) 149/60, Arterial BP: 113/59    Temp   36.3 °C (97.3 °F)    Pulse   75   Resp   20    SpO2   93 %      Anesthesia Post Evaluation    Patient location during evaluation: PACU  Patient participation: complete - patient participated  Level of consciousness: awake and alert    Airway patency: patent  Anesthetic complications: no  Cardiovascular status: hemodynamically stable  Respiratory status: face mask    PONV: none          No notable events documented.     Nurse Pain Score: 8 (NPRS)

## 2025-04-09 NOTE — PROGRESS NOTES
4 Eyes Skin Assessment Completed by RU Meeks and RU Morris.    Head WDL  Ears Redness and Blanching, Grey ear foams in place.   Nose WDL  Mouth WDL  Neck Incision, Right IJ CVC  Breast/Chest Incision, Mid-sternal surgical incision with Island dressing   Shoulder Blades Redness and Blanching  Spine WDL  (R) Arm/Elbow/Hand Blanching elbow, Right radial arterial line in place  (L) Arm/Elbow/Hand Blanching elbow  Abdomen Incision, Chest tube insertion sites  Groin WDL  Scrotum/Coccyx/Buttocks Redness and Blanching  (R) Leg WDL   (L) Leg WDL  (R) Heel/Foot/Toe Blanching heel  (L) Heel/Foot/Toe Blanching heel          Devices In Places ECG, Blood Pressure Cuff, Pulse Ox, Gatica, Arterial Line, SCD's, Central Line, Pacer, and Nasal Cannula      Interventions In Place NC W/Ear Foams, Chair Waffle, TAP System, Pillows, Q2 Turns, Low Air Loss Mattress, and Heels Loaded W/Pillows    Possible Skin Injury No    Pictures Uploaded Into Epic N/A  Wound Consult Placed N/A  RN Wound Prevention Protocol Ordered No

## 2025-04-09 NOTE — CARE PLAN
Problem: Pain - Standard  Goal: Alleviation of pain or a reduction in pain to the patient’s comfort goal  Description: Target End Date:  Prior to discharge or change in level of careDocument on Vitals flowsheet1.  Document pain using the appropriate pain scale per order or unit policy2.  Educate and implement non-pharmacologic comfort measures (i.e. relaxation, distraction, massage, cold/heat therapy, etc.)3.  Pain management medications as ordered4.  Reassess pain after pain med administration per policy5.  If opiods administered assess patient's response to pain medication is appropriate per POSS sedation scale6.  Follow pain management plan developed in collaboration with patient and interdisciplinary team (including palliative care or pain specialists if applicable)  Outcome: Progressing     Problem: Day of surgery post CABG/Heart valve replacement  Goal: Stabilization in immediate post op period  Outcome: Progressing  Intervention: VS q 15 min x 4 hours, then q 1 hour. Include temperature immediately upon arrival. Check CO/CI q 2-4 hours and PRN  Note: VS completed as ordered and as appropriate while on Vasoactive medications.   Intervention: First post op hour labs and EKG per order  Note: Labs and EKG completed as ordered.   Intervention: Serum K q 6 hours x 24 hours.  ABG and CBC prn.  Note: Serum Potassium as ordered and Potassium replaced as needed.  Intervention: Initiate post cardiac insulin infusion protocol orders for FSBS greater than 140 and check frequency per protocol  Note: Insulin drip off at 0100 this shift.   Intervention: FSBS frequency as per Cardiac Surgery Insulin Drip Protocol  Note: FSBS every hour as per protocol. Insulin drip off at 0100 this shift.   Intervention: For patients on Beta Blockers: verify dose given prior to surgery or within 6 hours after arrival to the unit  Note: Metoprolol was given prior to surgery.  Intervention: Chest tube to 20 cm suction, record CT drainage with  VS, and check for air leak  Note: Chest tube to suction and output monitored every hour.   Chest tube output this shift: 144mL  Intervention: Titrate and wean off vasoactive drips per patient's condition and per MD order while maintaining SBP  mmHg per MD order  Note: Vasoactive mediations weaned as tolerated per protocol.   Intervention: Wean from Vent per protocol (see protocol), extubation goal within 6 hours post op  Note: Extubated previous shift.   Intervention: IS q 1 hour while awake post extubation  Note: IS encouraged every hour while awake.  Best IS this shift: 2750    Intervention: Dangle within 4 hours post extubation  Note: Edge of bed completed previous shift.   Intervention: Up in chair 4 hours, day of extubation  Note: Patient up to chair in AM this shift.   Intervention: Maintain all original surgical dressings per provider orders and specifications  Note: Mid-sternal surgical dressing remains in place CDI.  Intervention: Clear liquids post extubation, order carbohydrate free (post cardiac surgery) diet, advance as tolerated  Note: Patient tolerated clear liquid diet for dinner, advanced to Cardiac diet.  Intervention: A-Fib and DVT prophylaxis per MD order or contraindications documented (refer to DVT/VTE problem on Care Plan)  Note: DVT prophylaxis in place.    The patient is Watcher - Medium risk of patient condition declining or worsening    Shift Goals  Clinical Goals: Maintain hemodynamics  Patient Goals: Rest  Family Goals: CHARY    Progress made toward(s) clinical / shift goals:  As above.    Patient is not progressing towards the following goals:

## 2025-04-09 NOTE — CARE PLAN
Problem: Fall Risk  Goal: Patient will remain free from falls  Outcome: Progressing  Note: Pt remains free from falls at this time. Safety precautions in place. Pt educated on calling for assistance when needed.     Verified that safety precautions are in place and education provided to pt on fall safety and utilization of call button       Problem: Knowledge Deficit - Standard  Goal: Patient and family/care givers will demonstrate understanding of plan of care, disease process/condition, diagnostic tests and medications  Outcome: Progressing  Note: Pt updated on POC, tests, and medications. Pt verbalizes understanding and has no further questions at this time. Pt educated on calling for any more questions.        Problem: Pain - Standard  Goal: Alleviation of pain or a reduction in pain to the patient’s comfort goal  Outcome: Progressing     Problem: Day of surgery post CABG/Heart valve replacement  Goal: Stabilization in immediate post op period  Outcome: Progressing  Intervention: VS q 15 min x 4 hours, then q 1 hour. Include temperature immediately upon arrival. Check CO/CI q 2-4 hours and PRN  Note: Vitals signs completed per protocol  Intervention: If radial artery used, elevate arm, no BP checks or needle sticks from affected arm, monitor ulnar pulse and capillary refill  Note: Arterial line in place, pulse ox in place below radial art line  Intervention: First post op hour labs and EKG per order  Note: Post op labs and EKG completed per protocol    Intervention: Serum K q 6 hours x 24 hours.  ABG and CBC prn.  Note: K scale in place and potassium replaced per protocol    Intervention: Initiate post cardiac insulin infusion protocol orders for FSBS greater than 140 and check frequency per protocol  Note: Insulin drip initiated  Intervention: FSBS frequency as per Cardiac Surgery Insulin Drip Protocol  Note: Blood sugars q1 hour per protocol  Intervention: For patients on Beta Blockers: verify dose given  prior to surgery or within 6 hours after arrival to the unit  Note: 12.5 mg of metoprolol given at 0608  Intervention: Chest tube to 20 cm suction, record CT drainage with VS, and check for air leak  Note: No air leak present, out put charted per protocol  Intervention: For CT drainage >300 mL in first hour post op and/or 150 mL in subsequent hours: Stat platelets, PT, INR, TEG, iSTAT, and H&H per order  Note: NA  Intervention: Titrate and wean off vasoactive drips per patient's condition and per MD order while maintaining SBP  mmHg per MD order  Note: Pt remains on small amount of cleviprex  Intervention: VAP protocol in place  Note: VAP protocol in place  Intervention: Wean from Vent per protocol (see protocol), extubation goal within 6 hours post op  Note: Extubation time 1403  Total time intubated 2 hours 55 minutes  Intervention: IS q 1 hour while awake post extubation  Note: 1350  Intervention: Bedrest until extubated and groin lines out  Note: completed  Intervention: Dangle within 4 hours post extubation  Note: Pt stood at bedside within 4 hours post extubation  Intervention: Up in chair 4 hours, day of extubation  Note: NA  Intervention: Maintain all original surgical dressings per provider orders and specifications  Note: Island dressing in place  Intervention: Clear liquids post extubation, order carbohydrate free (post cardiac surgery) diet, advance as tolerated  Note: Passed swallow evaluation and clear liquid diet ordered  Intervention: Discontinue [place] gregg and arterial line 12-18 hours post op if hemodynamically stable and off vasoactive drips  Note: Arterial line remains in place  Intervention: A-Fib and DVT prophylaxis per MD order or contraindications documented (refer to DVT/VTE problem on Care Plan)  Note: NA pt in SR with (o)PVC  Intervention: Amiodarone protocol per MD order  Note: NA   The patient is Watcher - Medium risk of patient condition declining or worsening         Progress made  toward(s) clinical / shift goals:  extubated, stood at edge of bed,pain well controlled and pulling 1350 on IS    Patient is not progressing towards the following goals: remains on small amount of cleveprex

## 2025-04-09 NOTE — PROGRESS NOTES
Notified Efren Corral that patient has become increasingly hypertensive post ambulation this morning with -150 and now on max dose Cleviprex, Metoprolol 12.5mg given with morning meds. Per Efren, will add Amlodipine and Lasix.

## 2025-04-09 NOTE — THERAPY
Physical Therapy Contact Note    Patient Name: Martir Hoyos Jr.  Age:  64 y.o., Sex:  male  Medical Record #: [id number]  Today's Date: 4/9/2025    Discussed missed therapy with RN       04/09/25 1101   Interdisciplinary Plan of Care Collaboration   Collaboration Comments marta douglas, pt with hypertension, hold this am, will return as able this pm.

## 2025-04-10 PROBLEM — N17.9 AKI (ACUTE KIDNEY INJURY) (HCC): Status: ACTIVE | Noted: 2025-04-10

## 2025-04-10 LAB
ANION GAP SERPL CALC-SCNC: 12 MMOL/L (ref 7–16)
BUN SERPL-MCNC: 26 MG/DL (ref 8–22)
CALCIUM SERPL-MCNC: 8.4 MG/DL (ref 8.5–10.5)
CHLORIDE SERPL-SCNC: 98 MMOL/L (ref 96–112)
CO2 SERPL-SCNC: 24 MMOL/L (ref 20–33)
CREAT SERPL-MCNC: 1.21 MG/DL (ref 0.5–1.4)
ERYTHROCYTE [DISTWIDTH] IN BLOOD BY AUTOMATED COUNT: 42.8 FL (ref 35.9–50)
GFR SERPLBLD CREATININE-BSD FMLA CKD-EPI: 67 ML/MIN/1.73 M 2
GLUCOSE BLD STRIP.AUTO-MCNC: 146 MG/DL (ref 65–99)
GLUCOSE BLD STRIP.AUTO-MCNC: 150 MG/DL (ref 65–99)
GLUCOSE SERPL-MCNC: 120 MG/DL (ref 65–99)
HCT VFR BLD AUTO: 38.3 % (ref 42–52)
HGB BLD-MCNC: 13.3 G/DL (ref 14–18)
MCH RBC QN AUTO: 30.2 PG (ref 27–33)
MCHC RBC AUTO-ENTMCNC: 34.7 G/DL (ref 32.3–36.5)
MCV RBC AUTO: 87 FL (ref 81.4–97.8)
PLATELET # BLD AUTO: 243 K/UL (ref 164–446)
PMV BLD AUTO: 10.4 FL (ref 9–12.9)
POTASSIUM SERPL-SCNC: 3.6 MMOL/L (ref 3.6–5.5)
RBC # BLD AUTO: 4.4 M/UL (ref 4.7–6.1)
SODIUM SERPL-SCNC: 134 MMOL/L (ref 135–145)
WBC # BLD AUTO: 16.4 K/UL (ref 4.8–10.8)

## 2025-04-10 PROCEDURE — A9270 NON-COVERED ITEM OR SERVICE: HCPCS

## 2025-04-10 PROCEDURE — 82962 GLUCOSE BLOOD TEST: CPT

## 2025-04-10 PROCEDURE — 700111 HCHG RX REV CODE 636 W/ 250 OVERRIDE (IP)

## 2025-04-10 PROCEDURE — A9270 NON-COVERED ITEM OR SERVICE: HCPCS | Performed by: THORACIC SURGERY (CARDIOTHORACIC VASCULAR SURGERY)

## 2025-04-10 PROCEDURE — 80048 BASIC METABOLIC PNL TOTAL CA: CPT

## 2025-04-10 PROCEDURE — A9270 NON-COVERED ITEM OR SERVICE: HCPCS | Performed by: NURSE PRACTITIONER

## 2025-04-10 PROCEDURE — 700102 HCHG RX REV CODE 250 W/ 637 OVERRIDE(OP): Performed by: THORACIC SURGERY (CARDIOTHORACIC VASCULAR SURGERY)

## 2025-04-10 PROCEDURE — A9270 NON-COVERED ITEM OR SERVICE: HCPCS | Performed by: EMERGENCY MEDICINE

## 2025-04-10 PROCEDURE — 700102 HCHG RX REV CODE 250 W/ 637 OVERRIDE(OP): Performed by: EMERGENCY MEDICINE

## 2025-04-10 PROCEDURE — 85027 COMPLETE CBC AUTOMATED: CPT

## 2025-04-10 PROCEDURE — 700111 HCHG RX REV CODE 636 W/ 250 OVERRIDE (IP): Mod: JZ | Performed by: NURSE PRACTITIONER

## 2025-04-10 PROCEDURE — 99233 SBSQ HOSP IP/OBS HIGH 50: CPT | Performed by: EMERGENCY MEDICINE

## 2025-04-10 PROCEDURE — 700102 HCHG RX REV CODE 250 W/ 637 OVERRIDE(OP)

## 2025-04-10 PROCEDURE — 94669 MECHANICAL CHEST WALL OSCILL: CPT

## 2025-04-10 PROCEDURE — 770020 HCHG ROOM/CARE - TELE (206)

## 2025-04-10 PROCEDURE — 700102 HCHG RX REV CODE 250 W/ 637 OVERRIDE(OP): Performed by: NURSE PRACTITIONER

## 2025-04-10 RX ORDER — FUROSEMIDE 10 MG/ML
40 INJECTION INTRAMUSCULAR; INTRAVENOUS DAILY
Status: DISCONTINUED | OUTPATIENT
Start: 2025-04-11 | End: 2025-04-12 | Stop reason: HOSPADM

## 2025-04-10 RX ORDER — POTASSIUM CHLORIDE 1500 MG/1
20 TABLET, EXTENDED RELEASE ORAL DAILY
Status: DISCONTINUED | OUTPATIENT
Start: 2025-04-11 | End: 2025-04-12 | Stop reason: HOSPADM

## 2025-04-10 RX ADMIN — ACETAMINOPHEN 1000 MG: 500 TABLET, FILM COATED ORAL at 00:14

## 2025-04-10 RX ADMIN — POTASSIUM CHLORIDE 20 MEQ: 1500 TABLET, EXTENDED RELEASE ORAL at 05:09

## 2025-04-10 RX ADMIN — OXYCODONE HYDROCHLORIDE 5 MG: 5 TABLET ORAL at 14:03

## 2025-04-10 RX ADMIN — ROSUVASTATIN CALCIUM 20 MG: 20 TABLET, FILM COATED ORAL at 16:26

## 2025-04-10 RX ADMIN — OXYCODONE HYDROCHLORIDE 5 MG: 5 TABLET ORAL at 19:31

## 2025-04-10 RX ADMIN — METOPROLOL TARTRATE 25 MG: 25 TABLET, FILM COATED ORAL at 16:26

## 2025-04-10 RX ADMIN — Medication 1 APPLICATOR: at 19:27

## 2025-04-10 RX ADMIN — OXYCODONE HYDROCHLORIDE 5 MG: 5 TABLET ORAL at 00:17

## 2025-04-10 RX ADMIN — TAMSULOSIN HYDROCHLORIDE 0.4 MG: 0.4 CAPSULE ORAL at 19:27

## 2025-04-10 RX ADMIN — ACETAMINOPHEN 1000 MG: 500 TABLET, FILM COATED ORAL at 05:10

## 2025-04-10 RX ADMIN — ASPIRIN 81 MG: 81 TABLET, COATED ORAL at 05:10

## 2025-04-10 RX ADMIN — OMEPRAZOLE 20 MG: 20 CAPSULE, DELAYED RELEASE ORAL at 05:10

## 2025-04-10 RX ADMIN — Medication 1 APPLICATOR: at 08:58

## 2025-04-10 RX ADMIN — FUROSEMIDE 40 MG: 10 INJECTION, SOLUTION INTRAVENOUS at 05:15

## 2025-04-10 RX ADMIN — SENNOSIDES AND DOCUSATE SODIUM 2 TABLET: 50; 8.6 TABLET ORAL at 05:10

## 2025-04-10 RX ADMIN — METOPROLOL TARTRATE 25 MG: 25 TABLET, FILM COATED ORAL at 05:11

## 2025-04-10 RX ADMIN — OXYCODONE HYDROCHLORIDE 10 MG: 10 TABLET ORAL at 22:22

## 2025-04-10 RX ADMIN — OXYCODONE HYDROCHLORIDE 10 MG: 10 TABLET ORAL at 06:46

## 2025-04-10 RX ADMIN — CLOPIDOGREL BISULFATE 75 MG: 75 TABLET, FILM COATED ORAL at 05:11

## 2025-04-10 RX ADMIN — ACETAMINOPHEN 1000 MG: 500 TABLET, FILM COATED ORAL at 17:26

## 2025-04-10 RX ADMIN — ACETAMINOPHEN 1000 MG: 500 TABLET, FILM COATED ORAL at 11:41

## 2025-04-10 RX ADMIN — MAGNESIUM SULFATE IN DEXTROSE 1 G: 10 INJECTION, SOLUTION INTRAVENOUS at 05:21

## 2025-04-10 RX ADMIN — MAGNESIUM HYDROXIDE 30 ML: 2400 SUSPENSION ORAL at 05:12

## 2025-04-10 RX ADMIN — POLYETHYLENE GLYCOL 3350 1 PACKET: 17 POWDER, FOR SOLUTION ORAL at 05:13

## 2025-04-10 SDOH — ECONOMIC STABILITY: TRANSPORTATION INSECURITY
IN THE PAST 12 MONTHS, HAS LACK OF RELIABLE TRANSPORTATION KEPT YOU FROM MEDICAL APPOINTMENTS, MEETINGS, WORK OR FROM GETTING THINGS NEEDED FOR DAILY LIVING?: NO

## 2025-04-10 SDOH — ECONOMIC STABILITY: TRANSPORTATION INSECURITY
IN THE PAST 12 MONTHS, HAS THE LACK OF TRANSPORTATION KEPT YOU FROM MEDICAL APPOINTMENTS OR FROM GETTING MEDICATIONS?: NO

## 2025-04-10 ASSESSMENT — LIFESTYLE VARIABLES
DOES PATIENT WANT TO STOP DRINKING: NO
HOW MANY TIMES IN THE PAST YEAR HAVE YOU HAD 5 OR MORE DRINKS IN A DAY: 60
ON A TYPICAL DAY WHEN YOU DRINK ALCOHOL HOW MANY DRINKS DO YOU HAVE: 0.2
HAVE YOU EVER FELT YOU SHOULD CUT DOWN ON YOUR DRINKING: NO
TOTAL SCORE: 0
TOTAL SCORE: 0
AVERAGE NUMBER OF DAYS PER WEEK YOU HAVE A DRINK CONTAINING ALCOHOL: 2
EVER HAD A DRINK FIRST THING IN THE MORNING TO STEADY YOUR NERVES TO GET RID OF A HANGOVER: NO
ALCOHOL_USE: YES
TOTAL SCORE: 0
CONSUMPTION TOTAL: POSITIVE
HAVE PEOPLE ANNOYED YOU BY CRITICIZING YOUR DRINKING: NO
EVER FELT BAD OR GUILTY ABOUT YOUR DRINKING: NO

## 2025-04-10 ASSESSMENT — PAIN DESCRIPTION - PAIN TYPE
TYPE: ACUTE PAIN
TYPE: SURGICAL PAIN
TYPE: ACUTE PAIN

## 2025-04-10 ASSESSMENT — FIBROSIS 4 INDEX
FIB4 SCORE: 1.35

## 2025-04-10 ASSESSMENT — PATIENT HEALTH QUESTIONNAIRE - PHQ9
SUM OF ALL RESPONSES TO PHQ9 QUESTIONS 1 AND 2: 0
1. LITTLE INTEREST OR PLEASURE IN DOING THINGS: NOT AT ALL
2. FEELING DOWN, DEPRESSED, IRRITABLE, OR HOPELESS: NOT AT ALL

## 2025-04-10 ASSESSMENT — SOCIAL DETERMINANTS OF HEALTH (SDOH)
WITHIN THE PAST 12 MONTHS, YOU WORRIED THAT YOUR FOOD WOULD RUN OUT BEFORE YOU GOT THE MONEY TO BUY MORE: NEVER TRUE
WITHIN THE PAST 12 MONTHS, THE FOOD YOU BOUGHT JUST DIDN'T LAST AND YOU DIDN'T HAVE MONEY TO GET MORE: NEVER TRUE
IN THE PAST 12 MONTHS, HAS THE ELECTRIC, GAS, OIL, OR WATER COMPANY THREATENED TO SHUT OFF SERVICE IN YOUR HOME?: NO

## 2025-04-10 NOTE — PROGRESS NOTES
4 Eyes Skin Assessment Completed by RU Zamorano and RU Graham.    Head WDL  Ears WDL  Nose WDL  Mouth WDL  Neck WDL  Breast/Chest sternal incision, chest tube sites  Shoulder Blades WDL  Spine WDL  (R) Arm/Elbow/Hand WDL  (L) Arm/Elbow/Hand WDL  Abdomen WDL  Groin WDL  Scrotum/Coccyx/Buttocks WDL  (R) Leg WDL  (L) Leg WDL  (R) Heel/Foot/Toe Redness and Blanching  (L) Heel/Foot/Toe Redness and Blanching          Devices In Places Tele Box      Interventions In Place N/A    Possible Skin Injury No    Pictures Uploaded Into Epic N/A  Wound Consult Placed N/A  RN Wound Prevention Protocol Ordered No

## 2025-04-10 NOTE — CARE PLAN
The patient is Stable - Low risk of patient condition declining or worsening    Shift Goals  Clinical Goals: ambulation, safety, pain control  Patient Goals: pain control  Family Goals: jimmy    Progress made toward(s) clinical / shift goals:    Problem: Fall Risk  Goal: Patient will remain free from falls  Outcome: Progressing     Problem: Knowledge Deficit - Standard  Goal: Patient and family/care givers will demonstrate understanding of plan of care, disease process/condition, diagnostic tests and medications  Outcome: Progressing     Problem: Post op day 2 CABG/Heart Valve Replacement  Goal: Optimal care of the post op CABG/heart valve replacement post op day 2  Outcome: Progressing  Note: FSBS discontinued. Daily weight done. Patient up in chair for meals. Patient ambulating with staff. Patient preforming ADL's Patient pulling 2250 on IS. All wires and lines removed.      Problem: Skin Integrity  Goal: Skin integrity is maintained or improved  Outcome: Progressing       Patient is not progressing towards the following goals:

## 2025-04-10 NOTE — ASSESSMENT & PLAN NOTE
Cr 1.2 from 0.8.  Nonoliguric.  Likely secondary to CPB, diuresis, ARB  Hold NSAIDs  Monitor UOP and creatinine, hold irbesartan if continued uptrend

## 2025-04-10 NOTE — CARE PLAN
Problem: Fall Risk  Goal: Patient will remain free from falls  Outcome: Progressing     Problem: Knowledge Deficit - Standard  Goal: Patient and family/care givers will demonstrate understanding of plan of care, disease process/condition, diagnostic tests and medications  Outcome: Progressing     Problem: Pain - Standard  Goal: Alleviation of pain or a reduction in pain to the patient’s comfort goal  Outcome: Progressing     Problem: Post Op Day 1 CABG/Heart Valve Replacement  Goal: Optimal care of the post op CABG/heart valve replacement Post Op Day 1  Intervention: EKG and CXR completed  Note: Chest xray and EKG performed this AM  Intervention: All valve patients: PT/INR daily  Note: NA, CABG x1  Intervention: Antibiotics are discontinued within 24 hours of anesthesia end time unless indication documented for continuation beyond 24 hours  Note: Antibiotics were discontinued yesterday 4/8  Intervention: Daily weights in the morning  Note: Pt weighed this AM  Intervention: Up in chair for all meals  Note: Pt up to chair for all three meals  Intervention: Ambulate in am if stable. First ambulation 25 feet. Repeat x 3 as tolerated  Note: Pt ambulated up to chair this AM and then 200 feet this afternoon when blood pressure was under control  Intervention: Discontinue parks catheter unless documented reason for continuation  Note: Parks to remain in place for strict I/Os per CTS  Intervention: Assess surgical dressing and check provider orders for potential removal  Note: Island dressing to sternal incision removed and wound cleansed with soap and water  Intervention: Ensure referal to intensive cardiac rehab is ordered, and smoking cessation education if appropriate  Note: Alondra will see patient tomorrow  Intervention: OHS trained RN to remove chest tubes if ordered by provider  Note: Chest tubes to remain in place per CTS  Intervention: IS q 1 hour while awake and record best IS volume  Note: 1250  Intervention:  Knee high DHRUV hose, on during the day, off at night  Note: DHRUV in place  Intervention: Saline lock IV  Note: Central line saline locked  Intervention: Transfer to [facility] status, begin VS q 4 hours  Note: NA  Intervention: After 24th hour post-anesthesia end time, transition patient to Cardiac Surgery SQ Insulin Protocol  Note: Pt transitioned to sliding scale    Intervention: If patient is CABG or on home beta-blocker, start/resume beta-blocker on POD 1 or POD 2 or document contraindication  Note: Metoprolol given   The patient is Watcher - Medium risk of patient condition declining or worsening    Shift Goals  Clinical Goals: Maintain hemodynamics  Patient Goals: Rest  Family Goals: CHARY    Progress made toward(s) clinical / shift goals:  off cleviprex and up and abulating    Patient is not progressing towards the following goals:

## 2025-04-10 NOTE — PROGRESS NOTES
Bedside report received from night RN, pt care assumed, assessment completed. Pt is A&O4, pain 2, NSR on the monitor. Updated on POC, questions answered. Bed in lowest, locked position, treaded socks on, call light and belongings within reach.

## 2025-04-10 NOTE — CARE PLAN
The patient is Stable - Low risk of patient condition declining or worsening    Shift Goals  Clinical Goals: SBP less than 130, mobility,  Patient Goals: rest  Family Goals: CHARY    Progress made toward(s) clinical / shift goals:    Problem: Fall Risk  Goal: Patient will remain free from falls  Outcome: Progressing     Problem: Pain - Standard  Goal: Alleviation of pain or a reduction in pain to the patient’s comfort goal  Outcome: Progressing     Problem: Post Op Day 1 CABG/Heart Valve Replacement  Goal: Optimal care of the post op CABG/heart valve replacement Post Op Day 1  Outcome: Progressing  Intervention: EKG and CXR completed  Note: EKG and CXR completed post op day 1  Intervention: All valve patients: PT/INR daily  Note: NA- not a valve patient  Intervention: Antibiotics are discontinued within 24 hours of anesthesia end time unless indication documented for continuation beyond 24 hours  Note: ABX discontinued withing 24 hours post anesthesia end time  Intervention: Daily weights in the morning  Note: Daily weight taken in AM  Intervention: Up in chair for all meals  Note: Up to chair for all meals  Intervention: Ambulate in am if stable. First ambulation 25 feet. Repeat x 3 as tolerated  Note: Ambulated into hallway, down hallway and increased distance  Intervention: Discontinue parks catheter unless documented reason for continuation  Note: Continue to have parks per CTS  Intervention: Assess surgical dressing and check provider orders for potential removal  Note: Surgical dressing removed, site cleansed and left open to air  Intervention: Ensure referal to intensive cardiac rehab is ordered, and smoking cessation education if appropriate  Note: Currently being assessed for rehab needs and discharge needs  Intervention: OHS trained RN to remove chest tubes if ordered by provider  Note: Chest tubes still in place per CTS  Intervention: IS q 1 hour while awake and record best IS volume  Note: IS completed q 1  hours while awake  Intervention: Knee high DHRUV hose, on during the day, off at night  Note: DHRUV hose on during day and SCDs on at night  Intervention: Saline lock IV  Note: All Ivs saline locked and PIV's removed  Intervention: Transfer to tele status, begin VS q 4 hours  Note: Awaiting orders for tele status  Intervention: After 24th hour post-anesthesia end time, transition patient to Cardiac Surgery SQ Insulin Protocol  Note: Transitioned to CTS insulin protocol  Intervention: If patient is CABG or on home beta-blocker, start/resume beta-blocker on POD 1 or POD 2 or document contraindication  Note: Home beta blocker resumed on POD 1

## 2025-04-10 NOTE — PROGRESS NOTES
Patient is transferring to TELE 8  rm 807  and report given to Gladys VENCES, patient aware of room transfer

## 2025-04-10 NOTE — PROGRESS NOTES
Assumed care of patient. Patient a n o 4. Patient ton RA. VSS. Patient placed on tele box. Monitors notified. Updated patient and family on POC. Call light in reach. Fall precautions in place.

## 2025-04-10 NOTE — PROGRESS NOTES
Cardiovascular Surgery Progress Note    Name: Martir Hoyos Jr.  MRN: [id number]  : 1960  Admit Date: 2025  5:11 AM  2 Days Post-Op     Procedure:  Procedure(s) and Anesthesia Type:     * CORONARY BYPASS GRAFTING X1 - General     * ECHOCARDIOGRAM, TRANSESOPHAGEAL, INTRAOPERATIVE - General    Vitals:  Vitals:    04/10/25 0648 04/10/25 0700 04/10/25 0800 04/10/25 0853   BP:  116/62 102/57    Pulse: 85 81 78    Resp: 18 18 15    Temp:       TempSrc:       SpO2: 97% 94% 92%    Weight:    124 kg (273 lb 2.4 oz)   Height:          Temp (24hrs), Av.2 °C (98.9 °F), Min:36.9 °C (98.4 °F), Max:37.4 °C (99.3 °F)      Respiratory:    Respiration: 15, Pulse Oximetry: 92 %       Fluids:    Intake/Output Summary (Last 24 hours) at 4/10/2025 1008  Last data filed at 4/10/2025 0900  Gross per 24 hour   Intake 1624.16 ml   Output 4890 ml   Net -3265.84 ml     Admit weight: Weight: 122 kg (269 lb 6.4 oz)  Current weight: Weight: 124 kg (273 lb 2.4 oz) (04/10/25 0853)    Labs:  Recent Labs     25  1120 25  0400 04/10/25  0223   WBC  --  17.3* 16.4*   RBC  --  4.69* 4.40*   HEMOGLOBIN 14.2 14.1 13.3*   HEMATOCRIT 41.4* 40.7* 38.3*   MCV  --  86.8 87.0   MCH  --  30.1 30.2   MCHC  --  34.6 34.7   RDW  --  41.2 42.8   PLATELETCT 228 204 243   MPV  --  10.6 10.4     Recent Labs     25  0400 25  0555 04/10/25  0223   SODIUM 135  --  134*   POTASSIUM 4.3 4.1 3.6   CHLORIDE 105  --  98   CO2 19*  --  24   GLUCOSE 154*  --  120*   BUN 18  --  26*   CREATININE 0.83  --  1.21   CALCIUM 8.1*  --  8.4*     Recent Labs     25  1120   APTT 27.4   INR 1.20*       HgbA1c:  5.6    Diabetic Educator Consult:  N/A    Medications:  Scheduled Medications   Medication Dose Frequency    [START ON 2025] furosemide  40 mg DAILY    [START ON 2025] potassium chloride SA  20 mEq DAILY    insulin lispro  2-9 Units 4X/DAY ACHS    rosuvastatin  20 mg Q EVENING    Nozin nasal  swab  1 Applicator  BID    metoprolol tartrate  25 mg BID    aspirin  81 mg DAILY    clopidogrel  75 mg DAILY    acetaminophen  1,000 mg Q6HRS    senna-docusate  2 Tablet BID    And    polyethylene glycol/lytes  1 Packet DAILY    And    magnesium hydroxide  30 mL DAILY    omeprazole  20 mg DAILY    tamsulosin  0.4 mg Nightly        Exam:   Physical Exam  Vitals and nursing note reviewed.   Constitutional:       General: He is not in acute distress.     Appearance: Normal appearance.   HENT:      Head: Normocephalic.      Nose: Nose normal. No congestion.      Mouth/Throat:      Mouth: Mucous membranes are moist.      Pharynx: Oropharynx is clear.   Eyes:      Pupils: Pupils are equal, round, and reactive to light.   Cardiovascular:      Rate and Rhythm: Normal rate and regular rhythm.      Pulses: Normal pulses.      Heart sounds: Normal heart sounds.   Pulmonary:      Effort: Pulmonary effort is normal.      Breath sounds: Decreased breath sounds present.   Abdominal:      General: Bowel sounds are decreased. There is no distension.      Palpations: Abdomen is soft.   Musculoskeletal:      Cervical back: Normal range of motion and neck supple. No tenderness.   Skin:     General: Skin is warm and dry.      Comments: Midline surgical incision- c/d/i   Neurological:      General: No focal deficit present.      Mental Status: He is alert and oriented to person, place, and time. Mental status is at baseline.   Psychiatric:         Mood and Affect: Mood normal.         Behavior: Behavior normal.         Thought Content: Thought content normal.         Cardiac Medications:    ASA - Yes    Plavix - Yes    Post-operative Beta Blockers - Yes    Ace/ARB- No; contraindicated because of Normal EF    ARNI-  No; contraindicated because of Normal EF    Statin - Yes    Aldactone- No; contraindicated because of Normal EF    SGLT2i-  No; contraindicated because of Normal EF    Ejection Fraction:  65%    Telemetry:   4/9 SR  4/10  SR    Assessment/Plan:  POD 1  on clevi, SR, neuro intact, wounds intact, abdomen soft, fluid balance positive, wt up,  room air.  Plan:  Keep chest tubes and pacing wires. Diurese. Start amlodipine, wean clevi. IS/ambulate.     POD 2 HDS, SR- d/c pacer wires, diuresed well- decrease, d/c mediastinal tubes, d/c parks, amb/enc IS, transfer to tele    Disposition:  TBD

## 2025-04-10 NOTE — PROGRESS NOTES
Critical Care Progress Note    Date of admission  4/8/2025    Hospital Course  64 y.o. male with history of CAD, hypertension, hyperlipidemia, BPH, who presented with chest pain, orthopnea, and syncope.  He was found to have 100% LAD occlusion with reconstitution from collaterals from the circumflex on cath in 12/2024.  Underwent LIMA-LAD CABG with Dr. Buckley on 4/8.  Pre/post echo was normal.  CPB: 31min, XC: 19min    Interval Problem Update  Events in last 24 hours: 1v CABG, extubated uneventfully, hypertensive  N: awake, sitting up in bed  CV: MAP 70-90, enteral antihypertensives  R: room air, doing well with IS  FEN/GI: diet  /Renal: Cr 1.2 from 0.8, stable  I/O net negative 1.9L over last 24 hours.  320mL from chest tube.  Heme: hemoglobin 13  from 14 pre-op. Platelets 240K  ID: WBC 17K, no active infectious issues  Skin/MSK: n/a  Endo: insulin drip --> SSI     Review of Systems  ROS     Vital Signs for last 24 hours   Temp:  [36.9 °C (98.4 °F)-37.4 °C (99.3 °F)] 37.4 °C (99.3 °F)  Pulse:  [] 85  Resp:  [8-33] 9  BP: ()/(51-81) 131/64  SpO2:  [90 %-97 %] 93 %    Hemodynamic parameters for last 24 hours  CVP:  [2 MM HG-175 MM HG] 175 MM HG    Respiratory Information for the last 24 hours       Physical Exam   Physical Exam  Vitals reviewed.   Constitutional:       General: He is not in acute distress.     Comments: Sitting up in chair   Cardiovascular:      Rate and Rhythm: Normal rate and regular rhythm.      Comments: V wires in place    Chest tubes with serosanguinous drainage  Pulmonary:      Effort: No respiratory distress.   Abdominal:      General: There is no distension.   Neurological:      Comments: Pupils equal and reactive         Medications  Current Facility-Administered Medications   Medication Dose Route Frequency Provider Last Rate Last Admin    [Held by provider] amLODIPine (Norvasc) tablet 10 mg  10 mg Oral Q DAY WANDER Aguilar        [Held by provider] irbesartan  (Avapro) tablet 300 mg  300 mg Oral DAILY Edwin Patel M.D.   300 mg at 04/09/25 0944    insulin lispro (HumaLOG,AdmeLOG) subcutaneous injection  2-9 Units Subcutaneous 4X/DAY NIKO Patel M.D.   2 Units at 04/09/25 1211    And    dextrose 50 % (D50W) injection 25 g  25 g Intravenous Q15 MIN PRN Edwin Patel M.D.        furosemide (Lasix) injection 40 mg  40 mg Intravenous BID Thomas Xavier, A.P.R.N.   40 mg at 04/10/25 0515    potassium chloride SA (Kdur) tablet 20 mEq  20 mEq Oral BID Thomas Xavier, A.P.R.N.   20 mEq at 04/10/25 0509    [Held by provider] metOLazone (Zaroxolyn) tablet 2.5 mg  2.5 mg Oral Q DAY Thomas Xavier, A.P.R.N.   2.5 mg at 04/09/25 1201    hydrALAZINE (Apresoline) injection 20 mg  20 mg Intravenous Q6HRS PRN Thomas Xavier, A.P.R.N.   20 mg at 04/09/25 1317    rosuvastatin (Crestor) tablet 20 mg  20 mg Oral Q EVENING Edwin Patel M.D.   20 mg at 04/09/25 1737    Respiratory Therapy Consult   Nebulization Continuous RT CUCA Dela Cruz.-CSofia        NS infusion   Intravenous Continuous CUCA Dela Cruz.-CSofia   Infusion Ended Prior to Shift at 04/09/25 0700    NS infusion   Intravenous Continuous CUCA Dela Cruz.-CSofia   Infusion Ended Prior to Shift at 04/09/25 0700    Nozin nasal  swab  1 Applicator Each Nostril BID STEPHANIE Dela CruzC.   1 Applicator at 04/09/25 2042    metoprolol tartrate (Lopressor) tablet 25 mg  25 mg Oral BID KELECHI Dela CruzA.-C.   25 mg at 04/10/25 0511    aspirin EC tablet 81 mg  81 mg Oral DAILY KELECHI Dela CruzA.-C.   81 mg at 04/10/25 0510    clopidogrel (Plavix) tablet 75 mg  75 mg Oral DAILY KELECHI Dela CruzA.-C.   75 mg at 04/10/25 0511    Pharmacy Consult Request ...Pain Management Review 1 Each  1 Each Other PHARMACY TO DOSE Efren Corral P.A.-C.        acetaminophen (Tylenol) tablet 1,000 mg  1,000 mg Oral Q6HRS Efren Corral P.A.-C.   1,000 mg at 04/10/25 0510    Followed by    [START ON 4/18/2025] acetaminophen (Tylenol) tablet 1,000 mg   1,000 mg Oral Q6HRS PRN MARION Dela Cruz.A.-C.        oxyCODONE immediate-release (Roxicodone) tablet 5 mg  5 mg Oral Q3HRS PRN KELECHI Dela CruzA.-C.   5 mg at 04/10/25 0017    Or    oxyCODONE immediate release (Roxicodone) tablet 10 mg  10 mg Oral Q3HRS PRN MARION Dela Cruz.A.-C.   10 mg at 04/10/25 0646    Or    fentaNYL (Sublimaze) injection 50 mcg  50 mcg Intravenous Q3HRS PRN MARION Dela Cruz.A.-C.        traMADol (Ultram) 50 MG tablet 50 mg  50 mg Oral Q4HRS PRN KELECHI Dela CruzA.-C.   50 mg at 04/09/25 1321    midazolam (Versed) injection 2 mg  2 mg Intravenous Q HOUR PRN MARION Dela Cruz.A.-C.        sodium bicarbonate 8.4 % injection 50 mEq  50 mEq Intravenous Q HOUR PRN MARION Dela Cruz.A.-C.        morphine 4 MG/ML injection 4 mg  4 mg Intravenous Q HOUR PRN MARION Dela Cruz.A.-C.        ondansetron (Zofran) syringe/vial injection 8 mg  8 mg Intravenous Q6HRS PRN KELECHI Dela CruzA.-C.   8 mg at 04/08/25 1616    Or    prochlorperazine (Compazine) injection 10 mg  10 mg Intravenous Q6HRS PRN MARION Dela Cruz.A.-C.        acetaminophen (Tylenol) tablet 650 mg  650 mg Oral Q4HRS PRN MARION Dela Cruz.A.-C.        Or    acetaminophen (Tylenol) suppository 650 mg  650 mg Rectal Q4HRS PRN MARION Dela Cruz.A.-C.        senna-docusate (Pericolace Or Senokot S) 8.6-50 MG per tablet 2 Tablet  2 Tablet Oral BID KELECHI Dela CruzA.-C.   2 Tablet at 04/10/25 0510    And    polyethylene glycol/lytes (Miralax) Packet 1 Packet  1 Packet Oral DAILY KELECHI Dela CruzASofia-C.   1 Packet at 04/10/25 0513    And    magnesium hydroxide (Milk Of Magnesia) suspension 30 mL  30 mL Oral DAILY NYLA Dela Cruz-LUZ   30 mL at 04/10/25 0512    And    bisacodyl (Dulcolax) suppository 10 mg  10 mg Rectal QDAY PRN Efren Corral P.A.-C.        omeprazole (PriLOSEC) capsule 20 mg  20 mg Oral DAILY NYLA Dela Cruz-LUZ   20 mg at 04/10/25 0510    mag hydrox-al hydrox-simeth (Maalox Plus Es Or Mylanta Ds) suspension 30 mL  30 mL Oral Q4HRS PRN Efren  FRACISCO Corral        diphenhydrAMINE (Benadryl) tablet/capsule 25 mg  25 mg Oral HS PRN - MR X 1 Efren Corral P.A.-C.        electrolyte-A (Plasmalyte-A) infusion   Intravenous PRN Efren Corral P.A.-C. 999 mL/hr at 04/08/25 1507 New Bag at 04/08/25 1507    tamsulosin (Flomax) capsule 0.4 mg  0.4 mg Oral Nightly Yoakummargo Buckley D.OSofia   0.4 mg at 04/09/25 2042       Fluids    Intake/Output Summary (Last 24 hours) at 4/10/2025 0649  Last data filed at 4/10/2025 0600  Gross per 24 hour   Intake 2618.44 ml   Output 4520 ml   Net -1901.56 ml       Laboratory  Recent Labs     04/08/25  1120 04/08/25  1212 04/08/25  1356   ISTATAPH 7.394 7.461* 7.537*   ISTATAPCO2 35.6 27.7* 20.0*   ISTATAPO2 178* 80* 88   ISTATATCO2 23* 21* 18*   [id number] 100* 97 98   ISTATARTHCO3 21.7 19.8* 17.0*   ISTATARTBE -3 -3 -3   ISTATTEMP 36.1 C 36.1 C 37.1 C   ISTATFIO2 100 60 40   ISTATSPEC Arterial Arterial Arterial   ISTATAPHTC 7.407 7.475* 7.535*   [id number] 173* 75* 89         Recent Labs     04/07/25  0939 04/08/25  1120 04/08/25  1704 04/09/25  0400 04/09/25  0555 04/10/25  0223   SODIUM 137  --   --  135  --  134*   POTASSIUM 4.4 4.7   < > 4.3 4.1 3.6   CHLORIDE 105  --   --  105  --  98   CO2 22  --   --  19*  --  24   BUN 14  --   --  18  --  26*   CREATININE 0.95  --   --  0.83  --  1.21   MAGNESIUM  --  2.5  --   --   --   --    CALCIUM 9.0  --   --  8.1*  --  8.4*    < > = values in this interval not displayed.     Recent Labs     04/07/25  0939 04/09/25  0400 04/10/25  0223   ALTSGPT 48  --   --    ASTSGOT 36  --   --    ALKPHOSPHAT 66  --   --    TBILIRUBIN 0.6  --   --    GLUCOSE 106* 154* 120*     Recent Labs     04/07/25  0939 04/09/25  0400 04/10/25  0223   WBC 6.2 17.3* 16.4*   NEUTSPOLYS 63.60  --   --    LYMPHOCYTES 23.30  --   --    MONOCYTES 7.90  --   --    EOSINOPHILS 4.00  --   --    BASOPHILS 1.00  --   --    ASTSGOT 36  --   --    ALTSGPT 48  --   --    ALKPHOSPHAT 66  --   --    TBILIRUBIN 0.6  --   --       Recent Labs     04/07/25  0939 04/08/25  1120 04/09/25  0400 04/10/25  0223   RBC 5.43  --  4.69* 4.40*   HEMOGLOBIN 15.9 14.2 14.1 13.3*   HEMATOCRIT 47.7 41.4* 40.7* 38.3*   PLATELETCT 284 228 204 243   PROTHROMBTM 13.1 15.3*  --   --    APTT 26.0 27.4  --   --    INR 0.99 1.20*  --   --      Assessment/Plan  * Coronary artery disease  Assessment & Plan  100% LAD occlusion with collateral reconstitution from the circumflex.   Underwent GRIER-LAD with Dr. Buckley.  Operative course was uneventful.  Post-CPB echo notable for normal BiV function, trace MR. Risk for vasoplegia with pre-op ARB.  Patient arrived to the ICU intubated and accompanied by anesthesia.  Monitor hemodynamics, chest tube output, urine output, and hemoglobin    Titrate clevi for SBP<140  Reintroduce home antihypertensives  Discussed with Dr. Buckley.     SANDY (acute kidney injury) ([facility])  Assessment & Plan  Cr 1.2 from 0.8.  Nonoliguric.  Likely secondary to CPB, diuresis, ARB  Hold NSAIDs  Monitor UOP and creatinine, hold irbesartan if continued uptrend    Primary hypertension  Assessment & Plan  Continue irbesartan and HCTZ    Encounter for weaning from ventilator ([facility])  Assessment & Plan  Post cardiac surgery ventilator management for acute postoperative respiratory insufficiency due to sternotomy, anesthesia, sedative, narcotic, and decreased mobility. Extubted POD 0 uneventfully.  O2 for SpO2 92-96%  Incentive spirometry  Increase activity; up to chair as tolerated.      BPH (benign prostatic hyperplasia)  Assessment & Plan  resume home tamsulosin    Osteoarthritis of right hip- (present on admission)  Assessment & Plan  Hold glucosamine and celecoxib         VTE:  Lovenox  Ulcer: Not Indicated  Lines: Central Line  Ongoing indication addressed, Arterial Line  Ongoing indication addressed, and Gatica Catheter  Ongoing indication addressed    L3 follow-up    Critical care will sign off.  Pleas reach out with any further  concerns.

## 2025-04-11 LAB
ANION GAP SERPL CALC-SCNC: 10 MMOL/L (ref 7–16)
BUN SERPL-MCNC: 21 MG/DL (ref 8–22)
CALCIUM SERPL-MCNC: 8.5 MG/DL (ref 8.5–10.5)
CHLORIDE SERPL-SCNC: 94 MMOL/L (ref 96–112)
CO2 SERPL-SCNC: 28 MMOL/L (ref 20–33)
CREAT SERPL-MCNC: 0.93 MG/DL (ref 0.5–1.4)
EKG IMPRESSION: NORMAL
ERYTHROCYTE [DISTWIDTH] IN BLOOD BY AUTOMATED COUNT: 41.6 FL (ref 35.9–50)
GFR SERPLBLD CREATININE-BSD FMLA CKD-EPI: 92 ML/MIN/1.73 M 2
GLUCOSE SERPL-MCNC: 119 MG/DL (ref 65–99)
HCT VFR BLD AUTO: 38.3 % (ref 42–52)
HGB BLD-MCNC: 13 G/DL (ref 14–18)
MCH RBC QN AUTO: 29.5 PG (ref 27–33)
MCHC RBC AUTO-ENTMCNC: 33.9 G/DL (ref 32.3–36.5)
MCV RBC AUTO: 87 FL (ref 81.4–97.8)
PLATELET # BLD AUTO: 230 K/UL (ref 164–446)
PMV BLD AUTO: 10.7 FL (ref 9–12.9)
POTASSIUM SERPL-SCNC: 3.1 MMOL/L (ref 3.6–5.5)
RBC # BLD AUTO: 4.4 M/UL (ref 4.7–6.1)
SODIUM SERPL-SCNC: 132 MMOL/L (ref 135–145)
WBC # BLD AUTO: 13.6 K/UL (ref 4.8–10.8)

## 2025-04-11 PROCEDURE — 97163 PT EVAL HIGH COMPLEX 45 MIN: CPT

## 2025-04-11 PROCEDURE — 700102 HCHG RX REV CODE 250 W/ 637 OVERRIDE(OP): Performed by: NURSE PRACTITIONER

## 2025-04-11 PROCEDURE — 700102 HCHG RX REV CODE 250 W/ 637 OVERRIDE(OP)

## 2025-04-11 PROCEDURE — 99024 POSTOP FOLLOW-UP VISIT: CPT

## 2025-04-11 PROCEDURE — 93010 ELECTROCARDIOGRAM REPORT: CPT | Performed by: INTERNAL MEDICINE

## 2025-04-11 PROCEDURE — A9270 NON-COVERED ITEM OR SERVICE: HCPCS | Performed by: EMERGENCY MEDICINE

## 2025-04-11 PROCEDURE — 85027 COMPLETE CBC AUTOMATED: CPT

## 2025-04-11 PROCEDURE — 770020 HCHG ROOM/CARE - TELE (206)

## 2025-04-11 PROCEDURE — 80048 BASIC METABOLIC PNL TOTAL CA: CPT

## 2025-04-11 PROCEDURE — A9270 NON-COVERED ITEM OR SERVICE: HCPCS

## 2025-04-11 PROCEDURE — 700102 HCHG RX REV CODE 250 W/ 637 OVERRIDE(OP): Performed by: THORACIC SURGERY (CARDIOTHORACIC VASCULAR SURGERY)

## 2025-04-11 PROCEDURE — 700111 HCHG RX REV CODE 636 W/ 250 OVERRIDE (IP): Mod: JZ | Performed by: NURSE PRACTITIONER

## 2025-04-11 PROCEDURE — 97535 SELF CARE MNGMENT TRAINING: CPT

## 2025-04-11 PROCEDURE — A9270 NON-COVERED ITEM OR SERVICE: HCPCS | Performed by: NURSE PRACTITIONER

## 2025-04-11 PROCEDURE — A9270 NON-COVERED ITEM OR SERVICE: HCPCS | Performed by: THORACIC SURGERY (CARDIOTHORACIC VASCULAR SURGERY)

## 2025-04-11 PROCEDURE — 97166 OT EVAL MOD COMPLEX 45 MIN: CPT

## 2025-04-11 PROCEDURE — 94660 CPAP INITIATION&MGMT: CPT

## 2025-04-11 PROCEDURE — 700102 HCHG RX REV CODE 250 W/ 637 OVERRIDE(OP): Performed by: EMERGENCY MEDICINE

## 2025-04-11 PROCEDURE — 93005 ELECTROCARDIOGRAM TRACING: CPT | Mod: TC | Performed by: THORACIC SURGERY (CARDIOTHORACIC VASCULAR SURGERY)

## 2025-04-11 RX ORDER — POTASSIUM CHLORIDE 1500 MG/1
40 TABLET, EXTENDED RELEASE ORAL ONCE
Status: COMPLETED | OUTPATIENT
Start: 2025-04-11 | End: 2025-04-11

## 2025-04-11 RX ADMIN — ROSUVASTATIN CALCIUM 20 MG: 20 TABLET, FILM COATED ORAL at 17:25

## 2025-04-11 RX ADMIN — POLYETHYLENE GLYCOL 3350 1 PACKET: 17 POWDER, FOR SOLUTION ORAL at 05:23

## 2025-04-11 RX ADMIN — OXYCODONE HYDROCHLORIDE 5 MG: 5 TABLET ORAL at 21:49

## 2025-04-11 RX ADMIN — POTASSIUM CHLORIDE 20 MEQ: 1500 TABLET, EXTENDED RELEASE ORAL at 05:24

## 2025-04-11 RX ADMIN — Medication 1 APPLICATOR: at 21:49

## 2025-04-11 RX ADMIN — ACETAMINOPHEN 1000 MG: 500 TABLET, FILM COATED ORAL at 11:29

## 2025-04-11 RX ADMIN — ACETAMINOPHEN 1000 MG: 500 TABLET, FILM COATED ORAL at 17:25

## 2025-04-11 RX ADMIN — METOPROLOL TARTRATE 25 MG: 25 TABLET, FILM COATED ORAL at 05:27

## 2025-04-11 RX ADMIN — METOPROLOL TARTRATE 25 MG: 25 TABLET, FILM COATED ORAL at 17:25

## 2025-04-11 RX ADMIN — ACETAMINOPHEN 1000 MG: 500 TABLET, FILM COATED ORAL at 05:24

## 2025-04-11 RX ADMIN — OMEPRAZOLE 20 MG: 20 CAPSULE, DELAYED RELEASE ORAL at 05:25

## 2025-04-11 RX ADMIN — TAMSULOSIN HYDROCHLORIDE 0.4 MG: 0.4 CAPSULE ORAL at 21:49

## 2025-04-11 RX ADMIN — ASPIRIN 81 MG: 81 TABLET, COATED ORAL at 05:24

## 2025-04-11 RX ADMIN — SENNOSIDES AND DOCUSATE SODIUM 2 TABLET: 50; 8.6 TABLET ORAL at 05:24

## 2025-04-11 RX ADMIN — Medication 1 APPLICATOR: at 08:23

## 2025-04-11 RX ADMIN — SENNOSIDES AND DOCUSATE SODIUM 2 TABLET: 50; 8.6 TABLET ORAL at 17:25

## 2025-04-11 RX ADMIN — CLOPIDOGREL BISULFATE 75 MG: 75 TABLET, FILM COATED ORAL at 05:24

## 2025-04-11 RX ADMIN — POTASSIUM CHLORIDE 40 MEQ: 1500 TABLET, EXTENDED RELEASE ORAL at 11:29

## 2025-04-11 RX ADMIN — FUROSEMIDE 40 MG: 10 INJECTION, SOLUTION INTRAVENOUS at 05:25

## 2025-04-11 ASSESSMENT — PAIN DESCRIPTION - PAIN TYPE
TYPE: ACUTE PAIN

## 2025-04-11 ASSESSMENT — COGNITIVE AND FUNCTIONAL STATUS - GENERAL
SUGGESTED CMS G CODE MODIFIER DAILY ACTIVITY: CJ
MOVING FROM LYING ON BACK TO SITTING ON SIDE OF FLAT BED: A LITTLE
MOBILITY SCORE: 18
STANDING UP FROM CHAIR USING ARMS: A LITTLE
CLIMB 3 TO 5 STEPS WITH RAILING: A LITTLE
DAILY ACTIVITIY SCORE: 20
DRESSING REGULAR LOWER BODY CLOTHING: A LITTLE
MOVING TO AND FROM BED TO CHAIR: A LITTLE
HELP NEEDED FOR BATHING: A LITTLE
WALKING IN HOSPITAL ROOM: A LITTLE
DRESSING REGULAR UPPER BODY CLOTHING: A LITTLE
SUGGESTED CMS G CODE MODIFIER MOBILITY: CK
TURNING FROM BACK TO SIDE WHILE IN FLAT BAD: A LITTLE
TOILETING: A LITTLE

## 2025-04-11 ASSESSMENT — GAIT ASSESSMENTS
GAIT LEVEL OF ASSIST: STANDBY ASSIST
ASSISTIVE DEVICE: FRONT WHEEL WALKER
DISTANCE (FEET): 350

## 2025-04-11 ASSESSMENT — ACTIVITIES OF DAILY LIVING (ADL): TOILETING: INDEPENDENT

## 2025-04-11 ASSESSMENT — FIBROSIS 4 INDEX: FIB4 SCORE: 1.42

## 2025-04-11 NOTE — PROGRESS NOTES
Cardiovascular Surgery Progress Note    Name: Martir Hoyos Jr.  MRN: [id number]  : 1960  Admit Date: 2025  5:11 AM  3 Days Post-Op     Procedure:  Procedure(s) and Anesthesia Type:  Dr Buckley; 25     * CORONARY BYPASS GRAFTING X1 - General     * ECHOCARDIOGRAM, TRANSESOPHAGEAL, INTRAOPERATIVE - General    Vitals:  Vitals:    25 0108 25 0340 25 0527 25 0812   BP:  121/75 130/82 115/76   Pulse:  93 95 85   Resp:  15  15   Temp:  36.4 °C (97.5 °F)  36.1 °C (97 °F)   TempSrc:  Temporal  Temporal   SpO2:  93%  99%   Weight: 121 kg (266 lb 12.1 oz)      Height:          Temp (24hrs), Av.4 °C (97.6 °F), Min:36.1 °C (97 °F), Max:36.7 °C (98 °F)      Respiratory:    Respiration: 15, Pulse Oximetry: 99 %       Fluids:    Intake/Output Summary (Last 24 hours) at 2025 0946  Last data filed at 2025 0500  Gross per 24 hour   Intake 1030 ml   Output --   Net 1030 ml     Admit weight: Weight: 122 kg (269 lb 6.4 oz)  Current weight: Weight: 121 kg (266 lb 12.1 oz) (25 0108)    Labs:  Recent Labs     25  0400 04/10/25  0223 25  0035   WBC 17.3* 16.4* 13.6*   RBC 4.69* 4.40* 4.40*   HEMOGLOBIN 14.1 13.3* 13.0*   HEMATOCRIT 40.7* 38.3* 38.3*   MCV 86.8 87.0 87.0   MCH 30.1 30.2 29.5   MCHC 34.6 34.7 33.9   RDW 41.2 42.8 41.6   PLATELETCT 204 243 230   MPV 10.6 10.4 10.7     Recent Labs     25  0400 25  0555 04/10/25  0223 25  0035   SODIUM 135  --  134* 132*   POTASSIUM 4.3 4.1 3.6 3.1*   CHLORIDE 105  --  98 94*   CO2 19*  --  24 28   GLUCOSE 154*  --  120* 119*   BUN 18  --  26* 21   CREATININE 0.83  --  1.21 0.93   CALCIUM 8.1*  --  8.4* 8.5     Recent Labs     25  1120   APTT 27.4   INR 1.20*       HgbA1c:  5.6    Diabetic Educator Consult:  N/A    Medications:  Scheduled Medications   Medication Dose Frequency    furosemide  40 mg DAILY    potassium chloride SA  20 mEq DAILY    rosuvastatin  20 mg Q EVENING    Nozin nasal   swab  1 Applicator BID    metoprolol tartrate  25 mg BID    aspirin  81 mg DAILY    clopidogrel  75 mg DAILY    acetaminophen  1,000 mg Q6HRS    senna-docusate  2 Tablet BID    And    polyethylene glycol/lytes  1 Packet DAILY    And    magnesium hydroxide  30 mL DAILY    omeprazole  20 mg DAILY    tamsulosin  0.4 mg Nightly        Exam:   Physical Exam  Vitals and nursing note reviewed.   Constitutional:       General: He is not in acute distress.     Appearance: Normal appearance.   HENT:      Head: Normocephalic.      Nose: Nose normal. No congestion.      Mouth/Throat:      Mouth: Mucous membranes are moist.      Pharynx: Oropharynx is clear.   Eyes:      Pupils: Pupils are equal, round, and reactive to light.   Cardiovascular:      Rate and Rhythm: Normal rate and regular rhythm.      Pulses: Normal pulses.      Heart sounds: Normal heart sounds.   Pulmonary:      Effort: Pulmonary effort is normal.      Breath sounds: Decreased breath sounds present.   Abdominal:      General: Bowel sounds are decreased. There is no distension.      Palpations: Abdomen is soft.   Musculoskeletal:      Cervical back: Normal range of motion and neck supple. No tenderness.   Skin:     General: Skin is warm and dry.      Comments: Midline surgical incision- c/d/i   Neurological:      General: No focal deficit present.      Mental Status: He is alert and oriented to person, place, and time. Mental status is at baseline.   Psychiatric:         Mood and Affect: Mood normal.         Behavior: Behavior normal.         Thought Content: Thought content normal.         Cardiac Medications:    ASA - Yes    Plavix - Yes    Post-operative Beta Blockers - Yes    Ace/ARB- No; contraindicated because of Normal EF    ARNI-  No; contraindicated because of Normal EF    Statin - Yes    Aldactone- No; contraindicated because of Normal EF    SGLT2i-  No; contraindicated because of Normal EF    Ejection Fraction:  65%    Telemetry:   4/9  SR  4/10 SR  4/11 SR     Assessment/Plan:  POD 1  on clevi, SR, neuro intact, wounds intact, abdomen soft, fluid balance positive, wt up,  room air.  Plan:  Keep chest tubes and pacing wires. Diurese. Start amlodipine, wean clevi. IS/ambulate.     POD 2 HDS, SR- d/c pacer wires, diuresed well- decrease, d/c mediastinal tubes, d/c parks, amb/enc IS, transfer to tele    POD 3 HDS, SR, room air. Neuro intact, wounds CDI. Abd soft, NT, BM +. Fluid near equivocal, wt below admit, Cr: 0.93. K 3.1: Plan: Replace K, check mag. Therapies eval pending, d/w RN. Anticipate home in next 1-2 days.     Disposition:  PT- eval pending  OT-eval pending

## 2025-04-11 NOTE — PROGRESS NOTES
Bedside report received from off going RN Gladys, assumed care of patient.     Fall Risk Score: MODERATE RISK  Fall risk interventions in place: Place yellow fall risk ID band on patient, Provide patient/family education based on risk assessment, Educate patient/family to call staff for assistance when getting out of bed, Place fall precaution signage outside patient door, and Utilize bed/chair fall alarm  Bed type: Regular (Alex Score less than 17 interventions in place)  Patient on cardiac monitor: Yes  IVF/IV medications: Not Applicable   Oxygen: Room Air  Bedside sitter: Not Applicable   Isolation: Not applicable

## 2025-04-11 NOTE — THERAPY
Occupational Therapy Contact Note    Attempted; pt fatigued after PT eval. Will attempt later as able or will follow up tomorrow.     Lashanda Naranjo OTR/L

## 2025-04-11 NOTE — THERAPY
Occupational Therapy   Initial Evaluation     Patient Name: Martir Hoyos Jr.  Age:  64 y.o., Sex:  male  Medical Record #: [id number]  Today's Date: 4/11/2025     Precautions  Precautions: Fall Risk, Cardiac Precautions (See Comments), Sternal Precautions (See Comments)  Comments: CABG 4/8    Assessment  Patient is a 64 y.o. male POD #3 CABG x1 after presenting on 4/8 with chest pain, orthopnea and syncope. Pt was found to have 100% LAD occlusion. Additional factors influencing patient status / progress: PMHx includes CAD, hypertension, hyperlipidemia, BPH.      During OT eval, pt was receptive to all education and training provided for sternal precautions in the context of ADLs and related mobility. Pt was received starting to shower with CNA assistance and was agreeable to OT eval and education. Pt needed overall Shar[place] for bathing and LBD and reports his spouse can assist. Pt needed SBA for intermittent standing during the shower and walking back to the bed end of session. Anticipate one more OT tx if pt remains in-house to address goals below, otherwise no further OT needs upon discharge.     Plan    Occupational Therapy Initial Treatment Plan   Treatment Interventions: Self Care / Activities of Daily Living, Adaptive Equipment, Neuro Re-Education / Balance, Therapeutic Exercises, Therapeutic Activity  Treatment Frequency: 4 Times per Week  Duration: Until Therapy Goals Met (likely one more)    DC Equipment Recommendations: Tub / Shower Seat  Discharge Recommendations: Anticipate that the patient will have no further occupational therapy needs after discharge from the hospital (with spouse assisting as needed at home)     Subjective    Pt agreeable to OT eval.      Objective     04/11/25 5055   Initial Contact Note    Initial Contact Note Order Received and Verified, Occupational Therapy Evaluation in Progress with Full Report to Follow.   Prior Living Situation   Prior Services Home-Independent   Housing /  Facility [address] [facility]   Steps Into Home 1   Steps In Home 0   Bathroom Set up Walk In Shower   Equipment Owned Front-Wheel Walker;Hand Held Shower   Lives with - Patient's Self Care Capacity Spouse   Comments Pt reports his spouse is currently home and able to assist as needed   Prior Level of ADL Function   Self Feeding Independent   Grooming / Hygiene Independent   Bathing Independent   Dressing Independent   Toileting Independent   Prior Level of IADL Function   Medication Management Independent   Laundry Independent   Kitchen Mobility Independent   Finances Independent   Home Management Independent   Shopping Independent   Prior Level Of Mobility Independent Without Device in Community   Driving / Transportation Driving Independent   Occupation (Pre-Hospital Vocational) Employed Full Time  ()   Precautions   Comments CABG 4/8   Vitals   Vitals Comments vitals WNL post shower   Pain   Intervention Declines   Non Verbal Descriptors   Non Verbal Scale  Calm   Cognition    Cognition / Consciousness WDL   Level of Consciousness Alert   Comments Pleasant and cooperative, fatigued   Active ROM Upper Body   Active ROM Upper Body  WDL   Comments WFL within sternal precautions   Strength Upper Body   Upper Body Strength  WDL   Comments WFL within sternal precautions   Sensation Upper Body   Upper Extremity Sensation  Not Tested   Upper Body Muscle Tone   Upper Body Muscle Tone  WDL   Neurological Concerns   Neurological Concerns No   Coordination Upper Body   Coordination WDL   Balance Assessment   Sitting Balance (Static) Good   Sitting Balance (Dynamic) Good   Standing Balance (Static) Fair +   Standing Balance (Dynamic) Fair   Weight Shift Sitting Good   Weight Shift Standing Good   Comments no AD for intermittent standing in the shower, FWW back to the bed   Bed Mobility    Sit to Supine Standby Assist   Scooting Supervised   Comments HOB flat, cues to not use arms to scoot backwards from long  sit   ADL Assessment   Bathing Minimal Assist  (assist for drying distal BLEs, pt primarily stayed seated but intermittently stood with SBA. Used [facility])   Upper Body Dressing Supervision  (gown)   Lower Body Dressing Minimal Assist  (don underwear with assist for second leg, assisted pt with socks per request due to fatigue. Reports spouse can assist)   Toileting   (reviewed toileting strategies and sternal precautions)   How much help from another person does the patient currently need...   Putting on and taking off regular lower body clothing? 3   Bathing (including washing, rinsing, and drying)? 3   Toileting, which includes using a toilet, bedpan, or urinal? 3   Putting on and taking off regular upper body clothing? 3   Taking care of personal grooming such as brushing teeth? 4   Eating meals? 4   6 Clicks Daily Activity Score 20   Functional Mobility   Sit to Stand Standby Assist   Bed, Chair, Wheelchair Transfer Standby Assist   Tub / Shower Transfers Standby Assist  (no threshold to shower bench)   Transfer Method Stand Step   Mobility ADL related mobility with FWW   Activity Tolerance   Sitting in Chair 15+ min on shower bench   Standing functional during intermittent stands during shower   Comments fatigues   Short Term Goals   Short Term Goal # 1 Pt will demo LBD with SPV and AE PRN   Short Term Goal # 2 Pt will don shirt with SPV and min cues for sternal precautions   Education Group   Education Provided Sternal Precautions;Activities of Daily Living   Role of Occupational Therapist Patient Response Patient;Acceptance;Explanation;Verbal Demonstration   Sternal Precautions Patient Response Patient;Acceptance;Explanation;Demonstration;Verbal Demonstration;Action Demonstration;Reinforcement Needed   ADL Patient Response Patient;Acceptance;Explanation;Demonstration;Verbal Demonstration;Action Demonstration;Reinforcement Needed   Occupational Therapy Initial Treatment Plan    Treatment Interventions Self Care /  Activities of Daily Living;Adaptive Equipment;Neuro Re-Education / Balance;Therapeutic Exercises;Therapeutic Activity   Treatment Frequency 4 Times per Week   Duration Until Therapy Goals Met  (likely one more)   Problem List   Problem List Decreased Active Daily Living Skills;Decreased Homemaking Skills;Decreased Functional Mobility;Decreased Activity Tolerance   Anticipated Discharge Equipment and Recommendations   DC Equipment Recommendations Tub / Shower Seat   Discharge Recommendations Anticipate that the patient will have no further occupational therapy needs after discharge from the hospital  (with spouse assisting as needed at home)   Interdisciplinary Plan of Care Collaboration   IDT Collaboration with  Nursing;Certified Nursing Assistant   Patient Position at End of Therapy In Bed;Bed Alarm On;Call Light within Reach;Tray Table within Reach;Phone within Reach   Collaboration Comments CNA present throughout, RN updated   Session Information   Date / Session Number  4/11 #1 (1/4, 4/17) likely one more

## 2025-04-11 NOTE — DISCHARGE PLANNING
"Discharge Discussion and home care recap prior to discharge:      Discharge review was completed with patient and wife    Patient was reminded that outpatient cardiac rehab beginning 6 weeks post op. They were told it is highly recommended and available to them if desired, but not required. They were told to look at the provided flyer for the contact number and additional information.    Patient was reminded to utilize the vitals log book provided to take his/her weight daily and record.    Patient was told to call me with weight gain > 3 lbs in 1 day or 5 lbs in 1 week  Patient was also told to record heart rate and blood pressure morning and night; and to call for concerning trends.    Patient was reminded to review the \"recovery after heart surgery\" packet with information on normal expectations such as clicking in the chest, loud/pounding heart/ hot and cold flashes, weight loss, constipation, insomnia, tingling on left side of chest (CABG with LIMA).  I also reviewed the decision tree of whom to call and when with concerns after going home. RN navigator Monday through Friday during business hours for any questions or urgent concerns, and the office for urgent concerns at night or on the weekends.    I briefly recapped the discharge instructions that their primary RN will review in depth prior to discharge   1) appointments   2) medication reconciliation (start, continue, change, stop)   3) care instructions    All additional questions were answered or deferred to the bedside RN.    Event time 20 minutes    "

## 2025-04-11 NOTE — PROGRESS NOTES
Bedside report received from off going RN/tech: Melva, assumed care of patient.   Overnight Events: no overnight events per night RN. POD #3 all tubes removed yesterday OOB for breakfast. Steady gait ambulating to restroom. Pt reminded to use urinal for I/O's   AFEBRILE  A&O x 4  Oxygen: Room Air  SBP Ranged: low 100's -130's  Patient on cardiac monitor: Yes SR 80's -90's   IVF/IV medications: Not Applicable   Fall Risk Score: moderate fall risk   Fall risk interventions in place: Place yellow fall risk ID band on patient, Provide patient/family education based on risk assessment, Educate patient/family to call staff for assistance when getting out of bed, Place fall precaution signage outside patient door, Place patient in room close to nursing station, Utilize bed/chair fall alarm, Notify charge of high risk for huddle, and Bed alarm connected correctly  Bed type: Regular (Alex Score less than 17 interventions in place)  Bedside sitter: Not Applicable   Isolation: Not applicable

## 2025-04-11 NOTE — THERAPY
"Physical Therapy   Initial Evaluation     Patient Name: Martir Hoyos Jr.  Age:  64 y.o., Sex:  male  Medical Record #: [id number]  Today's Date: 4/11/2025     Precautions  Precautions: Fall Risk;Cardiac Precautions (See Comments);Sternal Precautions (See Comments)  Comments: 1v CABG 4/8    Assessment  Patient is 64 y.o. male that presented to acute 4/8/25 with complaints of chest pain, orthopnea, and syncope. Found to have 100% occlusion of LAD, patient is now s/p CABG with Dr. Buckley 4/8. EF 65%. PMHx significant for CAD, HTN, HLD, BPH.    He presented to PT with sternal pain, impaired balance, functional weakness, and decreased activity tolerance which are limiting their ability to safely perform functional mobility at [facility]. They mobilized as detailed below with no signs/symptoms of inappropriate hemodynamic response.    Additional time required for patient education to review \"Recovering from Heart Surgery\" handout; initiated education regarding:    physiology of cardiovascular system and hemodynamic response;  normal expectations after OHS;  sternal precautions;  home exercise program and appropriate activity progression;  self monitoring via RPE scale/talk test/HR;  activity schedule following OHS;  discharge concerns decision tree; and  benefits of outpatient [facility].    Patient was receptive to education and demonstrated understanding but would likely benefit from reinforcement. Recommend outpatient cardiac rehab.    Will follow.      Plan    Physical Therapy Initial Treatment Plan   Treatment Plan : Bed Mobility, Equipment, Family / Caregiver Training, Gait Training, Manual Therapy, Neuro Re-Education / Balance, Self Care / Home Evaluation, Stair Training, Therapeutic Activities, Therapeutic Exercise  Treatment Frequency: 4 Times per Week (anticipate 1x more)  Duration: Until Therapy Goals Met    DC Equipment Recommendations: None (has FWW)  Discharge Recommendations:  (outpatient " cardiac rehab)       Subjective    Patient received in recliner and agreeable to evaluation     Objective       04/11/25 0934   Vitals   O2 (LPM) 0   O2 Delivery Device None - Room Air   Pain 0 - 10 Group   Location Sternum   Therapist Pain Assessment During Activity;Nurse Notified   Prior Living Situation   Prior Services None   Housing / Facility 1 [address] House   Steps Into Home 1   Steps In Home 0   Equipment Owned Front-Wheel Walker   Lives with - Patient's Self Care Capacity Spouse   Comments Patient reported spouse is able to assist as needed   Prior Level of Functional Mobility   Bed Mobility Independent   Transfer Status Independent   Ambulation Independent   Ambulation Distance community   Assistive Devices Used None   Stairs Independent   Comments Patient works as ; plans to take time off work   Cognition    Cognition / Consciousness WDL   Level of Consciousness Alert   Comments tired but pleasant and cooperative, demonstrated understanding of education   Active ROM Upper Body   Comments patient moved BUE functionally during session   Active ROM Lower Body    Active ROM Lower Body  WDL   Strength Lower Body   Comments functional for mobility   Coordination Upper Body   Coordination WDL   Coordination Lower Body    Coordination Lower Body  WDL   Other Treatments   Other Treatments Provided additional time required for cardiac rehab education   Balance Assessment   Sitting Balance (Static) Fair +   Sitting Balance (Dynamic) Fair +   Standing Balance (Static) Fair   Standing Balance (Dynamic) Fair   Weight Shift Sitting Good   Weight Shift Standing Good   Comments used FWW in standing, no overt LOB   Bed Mobility    Comments NT, in recliner pre and post   Gait Analysis   Gait Level Of Assist Standby Assist   Assistive Device Front Wheel Walker   Distance (Feet) 350   # of Times Distance was Traveled 1   Deviation   (decrased marie, step length, clearance, forward flexed posture)   # of  Stairs Climbed 0   Weight Bearing Status no restrictions   Vision Deficits Impacting Mobility NT   Comments negative talk test with ambulation in unit   Functional Mobility   Sit to Stand Standby Assist   Bed, Chair, Wheelchair Transfer Standby Assist   Transfer Method Stand Step   6 Clicks Assessment - How much HELP from from another person do you currently need... (If the patient hasn't done an activity recently, how much help from another person do you think he/she would need if he/she tried?)   Turning from your back to your side while in a flat bed without using bedrails? 3   Moving from lying on your back to sitting on the side of a flat bed without using bedrails? 3   Moving to and from a bed to a chair (including a wheelchair)? 3   Standing up from a chair using your arms (e.g., wheelchair, or bedside chair)? 3   Walking in hospital room? 3   Climbing 3-5 steps with a railing? 3   6 clicks Mobility Score 18   Patient / Family Goals    Patient / Family Goal #1 go home   Short Term Goals    Short Term Goal # 1 Patient will move supine <> sitting EOB from flat bed without bed rail with supervision within 6tx in order to get in/out of bed   Short Term Goal # 2 Patient will move sitting <> standing with LRAD and supervision within 6tx in order to initiate transfers and gait   Short Term Goal # 3 Patient will ambulate 150ft with LRAD and supervision within 6tx in order to access environment   Short Term Goal # 4 Patient will ascend/descend 1 step with LRAD and supervision within 6tx in order to perform curb step in community   Short Term Goal # 5 Patient will verbalize cardiac rehab education including sternal precautions, talk test, and appropriate activity monitoring and modification strategies within 6tx in order to demonstrate understanding   Education Group   Education Provided Role of Physical Therapist;Cardiac Precautions;Sternal Precautions   Cardiac Precautions Patient Response  Patient;Acceptance;Explanation;Demonstration;Handout;Verbal Demonstration;Action Demonstration;Reinforcement Needed   Sternal Precautions Patient Response Patient;Acceptance;Explanation;Demonstration;Handout;Verbal Demonstration;Action Demonstration;Reinforcement Needed   Role of Physical Therapist Patient Response Patient;Acceptance;Explanation;Verbal Demonstration   Problem List    Problems Pain;Impaired Bed Mobility;Impaired Transfers;Impaired Ambulation;Functional Strength Deficit;Impaired Balance;Decreased Activity Tolerance;Limited Knowledge of Post-Op Precautions

## 2025-04-12 VITALS
HEIGHT: 73 IN | WEIGHT: 261.47 LBS | SYSTOLIC BLOOD PRESSURE: 98 MMHG | HEART RATE: 91 BPM | BODY MASS INDEX: 34.65 KG/M2 | TEMPERATURE: 96.8 F | RESPIRATION RATE: 18 BRPM | OXYGEN SATURATION: 95 % | DIASTOLIC BLOOD PRESSURE: 74 MMHG

## 2025-04-12 LAB
ANION GAP SERPL CALC-SCNC: 11 MMOL/L (ref 7–16)
BUN SERPL-MCNC: 20 MG/DL (ref 8–22)
CALCIUM SERPL-MCNC: 8.9 MG/DL (ref 8.5–10.5)
CHLORIDE SERPL-SCNC: 96 MMOL/L (ref 96–112)
CO2 SERPL-SCNC: 28 MMOL/L (ref 20–33)
CREAT SERPL-MCNC: 0.77 MG/DL (ref 0.5–1.4)
ERYTHROCYTE [DISTWIDTH] IN BLOOD BY AUTOMATED COUNT: 40.8 FL (ref 35.9–50)
GFR SERPLBLD CREATININE-BSD FMLA CKD-EPI: 100 ML/MIN/1.73 M 2
GLUCOSE SERPL-MCNC: 118 MG/DL (ref 65–99)
HCT VFR BLD AUTO: 39.4 % (ref 42–52)
HGB BLD-MCNC: 13.9 G/DL (ref 14–18)
MAGNESIUM SERPL-MCNC: 2.1 MG/DL (ref 1.5–2.5)
MCH RBC QN AUTO: 30.3 PG (ref 27–33)
MCHC RBC AUTO-ENTMCNC: 35.3 G/DL (ref 32.3–36.5)
MCV RBC AUTO: 85.8 FL (ref 81.4–97.8)
PLATELET # BLD AUTO: 250 K/UL (ref 164–446)
PMV BLD AUTO: 10.5 FL (ref 9–12.9)
POTASSIUM SERPL-SCNC: 3.4 MMOL/L (ref 3.6–5.5)
RBC # BLD AUTO: 4.59 M/UL (ref 4.7–6.1)
SODIUM SERPL-SCNC: 135 MMOL/L (ref 135–145)
WBC # BLD AUTO: 10.8 K/UL (ref 4.8–10.8)

## 2025-04-12 PROCEDURE — 83735 ASSAY OF MAGNESIUM: CPT

## 2025-04-12 PROCEDURE — 36415 COLL VENOUS BLD VENIPUNCTURE: CPT

## 2025-04-12 PROCEDURE — A9270 NON-COVERED ITEM OR SERVICE: HCPCS | Performed by: NURSE PRACTITIONER

## 2025-04-12 PROCEDURE — 700102 HCHG RX REV CODE 250 W/ 637 OVERRIDE(OP): Performed by: NURSE PRACTITIONER

## 2025-04-12 PROCEDURE — 99024 POSTOP FOLLOW-UP VISIT: CPT | Performed by: NURSE PRACTITIONER

## 2025-04-12 PROCEDURE — 80048 BASIC METABOLIC PNL TOTAL CA: CPT

## 2025-04-12 PROCEDURE — 700102 HCHG RX REV CODE 250 W/ 637 OVERRIDE(OP)

## 2025-04-12 PROCEDURE — A9270 NON-COVERED ITEM OR SERVICE: HCPCS

## 2025-04-12 PROCEDURE — 700111 HCHG RX REV CODE 636 W/ 250 OVERRIDE (IP): Performed by: NURSE PRACTITIONER

## 2025-04-12 PROCEDURE — 85027 COMPLETE CBC AUTOMATED: CPT

## 2025-04-12 RX ORDER — OMEPRAZOLE 20 MG/1
20 CAPSULE, DELAYED RELEASE ORAL DAILY
Qty: 30 CAPSULE | Refills: 2 | Status: SHIPPED | OUTPATIENT
Start: 2025-04-13

## 2025-04-12 RX ORDER — ACETAMINOPHEN 500 MG
TABLET ORAL
COMMUNITY
Start: 2025-04-12 | End: 2025-05-10

## 2025-04-12 RX ORDER — OXYCODONE HYDROCHLORIDE 5 MG/1
5 TABLET ORAL EVERY 6 HOURS PRN
Qty: 56 TABLET | Refills: 0 | Status: SHIPPED | OUTPATIENT
Start: 2025-04-12 | End: 2025-04-26

## 2025-04-12 RX ORDER — CLOPIDOGREL BISULFATE 75 MG/1
75 TABLET ORAL DAILY
Qty: 30 TABLET | Refills: 2 | Status: SHIPPED | OUTPATIENT
Start: 2025-04-13

## 2025-04-12 RX ORDER — ASPIRIN 81 MG/1
81 TABLET ORAL DAILY
COMMUNITY
Start: 2025-04-13

## 2025-04-12 RX ORDER — FUROSEMIDE 20 MG/1
20 TABLET ORAL DAILY
Qty: 30 TABLET | Refills: 1 | Status: SHIPPED | OUTPATIENT
Start: 2025-04-12

## 2025-04-12 RX ORDER — POTASSIUM CHLORIDE 750 MG/1
10 TABLET, EXTENDED RELEASE ORAL DAILY
Qty: 30 TABLET | Refills: 1 | Status: SHIPPED | OUTPATIENT
Start: 2025-04-12

## 2025-04-12 RX ADMIN — ASPIRIN 81 MG: 81 TABLET, COATED ORAL at 05:29

## 2025-04-12 RX ADMIN — ACETAMINOPHEN 1000 MG: 500 TABLET, FILM COATED ORAL at 05:29

## 2025-04-12 RX ADMIN — POTASSIUM CHLORIDE 20 MEQ: 1500 TABLET, EXTENDED RELEASE ORAL at 05:29

## 2025-04-12 RX ADMIN — CLOPIDOGREL BISULFATE 75 MG: 75 TABLET, FILM COATED ORAL at 05:29

## 2025-04-12 RX ADMIN — ACETAMINOPHEN 1000 MG: 500 TABLET, FILM COATED ORAL at 00:44

## 2025-04-12 RX ADMIN — OMEPRAZOLE 20 MG: 20 CAPSULE, DELAYED RELEASE ORAL at 05:29

## 2025-04-12 RX ADMIN — Medication 1 APPLICATOR: at 09:41

## 2025-04-12 RX ADMIN — METOPROLOL TARTRATE 25 MG: 25 TABLET, FILM COATED ORAL at 05:40

## 2025-04-12 RX ADMIN — FUROSEMIDE 40 MG: 10 INJECTION, SOLUTION INTRAVENOUS at 05:29

## 2025-04-12 ASSESSMENT — FIBROSIS 4 INDEX: FIB4 SCORE: 1.31

## 2025-04-12 ASSESSMENT — PAIN DESCRIPTION - PAIN TYPE: TYPE: ACUTE PAIN

## 2025-04-12 NOTE — DISCHARGE SUMMARY
DISCHARGE SUMMARY    ADMISSION DATE: 4/8/2025    DISCHARGE DATE: 4/12/2025    ADMITTING DIAGNOSES: Occlusion of LAD, hypertension, hyperlipidemia     DISCHARGE DIAGNOSES: Occlusion of LAD, hypertension, hyperlipidemia     PROCEDURES PERFORMED:   Dr. Buckley 4/8/2025:  CORONARY BYPASS GRAFTING X1 WITH SKELENTONIZED LIMA TO LAD- Wound Class: Clean with Drain  ECHOCARDIOGRAM, TRANSESOPHAGEAL, INTRAOPERATIVE - Wound Class: Clean Contaminated    HISTORY OF PRESENT ILLNESS:    The patient is a 64 y.o. male with a a past medical history significant for coronary artery disease, hypertension, hyperlipidemia, benign prostatic hyperplasia  with progressive fatigue and shortness of breath. He denied chest pain orthopnea, PND, syncope.  He had 100% occlusion of the LAD and was set-up for elective CABG x 1.     HOSPITAL COURSE:   POD 1  on clevi, SR, neuro intact, wounds intact, abdomen soft, fluid balance positive, wt up,  room air.  Plan:  Keep chest tubes and pacing wires. Diurese. Start amlodipine, wean clevi. IS/ambulate.      POD 2 HDS, SR- d/c pacer wires, diuresed well- decrease, d/c mediastinal tubes, d/c parks, amb/enc IS, transfer to tele     POD 3 HDS, SR, room air. Neuro intact, wounds CDI. Abd soft, NT, BM +. Fluid near equivocal, wt below admit, Cr: 0.93. K 3.1: Plan: Replace K, check mag. Therapies eval pending, d/w RN. Anticipate home in next 1-2 days.     POD 4 HDS, SR, Room air, ambulating, incision c/d/I, plan home today    TELEMETRY:  SR    RECENT LABS:     Lab Results   Component Value Date/Time    SODIUM 135 04/12/2025 12:55 AM    POTASSIUM 3.4 (L) 04/12/2025 12:55 AM    CHLORIDE 96 04/12/2025 12:55 AM    CO2 28 04/12/2025 12:55 AM    GLUCOSE 118 (H) 04/12/2025 12:55 AM    BUN 20 04/12/2025 12:55 AM    CREATININE 0.77 04/12/2025 12:55 AM    BUNCREATRAT 21.1 (H) 12/12/2024 10:14 AM      Lab Results   Component Value Date/Time    WBC 10.8 04/12/2025 12:55 AM    RBC 4.59 (L) 04/12/2025 12:55 AM     HEMOGLOBIN 13.9 (L) 04/12/2025 12:55 AM    HEMATOCRIT 39.4 (L) 04/12/2025 12:55 AM    MCV 85.8 04/12/2025 12:55 AM    MCH 30.3 04/12/2025 12:55 AM    MCHC 35.3 04/12/2025 12:55 AM    MPV 10.5 04/12/2025 12:55 AM    NEUTSPOLYS 63.60 04/07/2025 09:39 AM    LYMPHOCYTES 23.30 04/07/2025 09:39 AM    MONOCYTES 7.90 04/07/2025 09:39 AM    EOSINOPHILS 4.00 04/07/2025 09:39 AM    BASOPHILS 1.00 04/07/2025 09:39 AM      Lab Results   Component Value Date/Time    PROTHROMBTM 15.3 (H) 04/08/2025 11:20 AM    INR 1.20 (H) 04/08/2025 11:20 AM        Fluids:    Intake/Output Summary (Last 24 hours) at 4/12/2025 0753  Last data filed at 4/12/2025 0704  Gross per 24 hour   Intake --   Output 600 ml   Net -600 ml     Admit weight: Weight: 122 kg (269 lb 6.4 oz)  Current weight: Weight: 119 kg (261 lb 7.5 oz) (04/12/25 0504)    ALLERGIES:     Patient has no known allergies.    EJECTION FRACTION:  65%    CARDIAC MEDICATIONS:    ASA - Yes    Plavix - Yes    Post-operative Beta Blockers - Yes    Ace/ARB- Yes    ARNI-  No; contraindicated because of Normal EF    Statin - Yes    Aldactone- No; contraindicated because of Normal EF    SGLT2i-  No; contraindicated because of Normal EF    DISCHARGE MEDICATIONS:      Medication List        START taking these medications        Instructions   acetaminophen 500 MG Tabs  Start taking on: April 12, 2025  Commonly known as: Tylenol   Take 2 Tablets by mouth every 6 hours for 14 days, THEN 2 Tablets every 6 hours as needed for Mild Pain for up to 14 days.     aspirin 81 MG EC tablet  Start taking on: April 13, 2025   Take 1 Tablet by mouth every day.  Dose: 81 mg     clopidogrel 75 MG Tabs  Start taking on: April 13, 2025  Commonly known as: Plavix   Take 1 Tablet by mouth every day.  Dose: 75 mg     furosemide 20 MG Tabs  Commonly known as: Lasix   Take 1 Tablet by mouth every day.  Dose: 20 mg     omeprazole 20 MG delayed-release capsule  Start taking on: April 13, 2025  Commonly known as:  PriLOSEC   Take 1 Capsule by mouth every day.  Dose: 20 mg     oxyCODONE immediate-release 5 MG Tabs  Commonly known as: Roxicodone   Take 1 Tablet by mouth every 6 hours as needed for Severe Pain for up to 14 days.  Dose: 5 mg     potassium chloride SA 10 MEQ Tbcr  Commonly known as: K-Dur   Take 1 Tablet by mouth every day.  Dose: 10 mEq            CONTINUE taking these medications        Instructions   irbesartan 300 MG Tabs  Commonly known as: Avapro   Take 300 mg by mouth every day.  Dose: 300 mg     metoprolol SR 25 MG Tb24  Commonly known as: Toprol XL   Take 25 mg by mouth every day. FOR 30 DAYS  Dose: 25 mg     rosuvastatin 20 MG Tabs  Commonly known as: Crestor   Take 20 mg by mouth every evening.  Dose: 20 mg     tamsulosin 0.4 MG capsule  Commonly known as: Flomax   Take 0.4 mg by mouth every evening.  Dose: 0.4 mg            STOP taking these medications      celecoxib 200 MG Caps  Commonly known as: CeleBREX     glucosamine Sulfate 500 MG Caps     hydroCHLOROthiazide 25 MG Tabs              NARCOTIC PAIN MEDICATIONS:   In prescribing controlled substances to this patient, I certify that I have obtained and reviewed their medical history. I have also made a good satish effort to obtain applicable records from other providers who have treated the patient .    I have conducted a physical exam and documented it. I have reviewed the patient's prescription history as maintained by the [place] Prescription Monitoring Program.     I have assessed the patient’s risk for abuse, dependency, and addiction using the validated Opioid Risk Tool.    Given the above, I believe the benefits of controlled substance therapy outweigh the risks. The reasons for prescribing controlled substances include non-narcotic, oral analgesic alternatives have been inadequate for pain control. Accordingly, I have discussed the risk and benefits, treatment plan, and alternative therapies with the patient.     Pt understands this  prescription is a controlled substance which is potentially habit-forming and its use is regulated by the [facility]. It must be submitted to the pharmacy within 5 days of the date written and can not be called in or faxed to the pharmacy. Refills are subject to terms of a medicine agreement. Any refill requires a new prescription that must be obtained from this office during regular office hours Monday through Thursday 7 am to 4 pm. We ask for 72 hours notice to get an appointment for a narcotic pain medication refill. This medicine can cause nausea, significant constipation, sedation, confusion.     DIET:   Cardiac diet    DISCHARGE INSTRUCTIONS DISCUSSED WITH THE PATIENT:      1. NO driving for 4 weeks after surgery. You may ride as a passenger.  2. NO lifting of any item over 10 lbs (e.g. gallon of milk) for 6 weeks after surgery.  3. DO walk as much as possible! Walk a minimum of once a day. Depending on your fatigue and comfort level, you may walk as much as you wish. There is no maximum.  4. Other physical activities (sex, housework, gardening, etc.) are OK after 4 weeks   5. Continue using incentive spirometer for 2 weeks, especially if going home on oxygen.    Incision Care:  1. SHOWER ONLY - no baths. Clean incision daily with plain Ivory ® soap or any other dye or perfume free soap. Then pat incision dry with clean towel. Avoid creams or lotions on the incision(s).  a. If there is any increase in redness or swelling, or separation of the incision line, or thick drainage* from any of the incisions, call right away  * Clear, thin drainage is not abnormal especially from the leg incision and/or                         chest tube sites.  2. Continue to wear your DHRUV Stockings for 4 weeks. You may take off the stockings when in bed or when the legs are elevated.    Patient instructed to call [facility] cardiac surgery at [phone]  if any increased shortness of breath, uncontrolled pain, weight gain greater than 3 pounds  in 1 day or 5 pounds in 1 week, SBP >140, HR <60 or redness swelling or drainage of incisions.      FOLLOW-UP:   Future Appointments   Date Time Provider Department Center   5/12/2025 12:15 PM CT RESOURCE PROVIDER CT None

## 2025-04-12 NOTE — CARE PLAN
The patient is Watcher - Medium risk of patient condition declining or worsening    Shift Goals  Clinical Goals: (P) Monitor rythm and incision sites, manage pain, VSS  Patient Goals: (P) sleep  Family Goals: (P) jimmy    Progress made toward(s) clinical / shift goals:    Problem: Fall Risk  Goal: Patient will remain free from falls  Outcome: Progressing     Problem: Knowledge Deficit - Standard  Goal: Patient and family/care givers will demonstrate understanding of plan of care, disease process/condition, diagnostic tests and medications  Outcome: Progressing     Problem: Post Op Day 4 CABG/Heart Valve Replacement  Goal: Optimal care of the Post Op CABG/Heart Valve replacement Post Op Day 4  Outcome: Progressing  Intervention: Daily weights in the morning  Note: Weight will be obtained between 4127-5723.  Intervention: Shower daily and clean incisions twice daily with soap and water  Note: Pt showered with dayshift, incisions cleaned twice with dayshift and twice with night shift.   Intervention: Up in chair for all meals  Note: Pt up in chair for all meals with dayshift team.   Intervention: Ambulate 4 times daily, increasing the distance each time  Note: Pt ambulated 2x with dayshift and 2x with night shift.   Intervention: IS q 1 hour while awake and record best IS volume  Note: Pt pulling 2000 on I.S  Intervention: Consider removal of parks, chest tube and pacer wires if not already done  Note: Parks, chest tube, pacer wires already removed from pt.

## 2025-04-12 NOTE — CARE PLAN
Problem: Post Op Day 3 CABG/Heart Valve replacement  Goal: Optimal care of the post op CABG/Heart Valve replacement post op day 3  Intervention: Daily weights in the morning  Note: Daily weight obtained and charted   Intervention: Shower daily and clean incisions twice daily with soap and water  Note: Pt showered and preformed incision care   Intervention: Up in chair for all meals  Note: OOB for all meals   Intervention: Ambulate 4 times daily, increasing the distance each time  Note: Pt has been ambulating halls without difficulty, increasing distance each time   Intervention: IS q 1 hour while awake and record best IS volume  Note: Currently pulling 2000. Room Air   Intervention: Consider removal of parks, chest tube and pacer wires if not already done  Note: Parks, pacer wires, and chest tubes removed 4/ 10    The patient is Stable - Low risk of patient condition declining or worsening    Shift Goals  Clinical Goals: ambulate / IS/ pain control  Patient Goals: comfort  Family Goals: jimmy    Progress made toward(s) clinical / shift goals:  progressing     Patient is not progressing towards the following goals:

## 2025-04-12 NOTE — PROGRESS NOTES
Bedside report received from off going RN/tech: RU Vazquez, assumed care of patient.     Fall Risk Score: MODERATE RISK  Fall risk interventions in place: Provide patient/family education based on risk assessment, Educate patient/family to call staff for assistance when getting out of bed, Place fall precaution signage outside patient door, Utilize bed/chair fall alarm, and Bed alarm connected correctly  Bed type: Regular (Alex Score less than 17 interventions in place)  Patient on cardiac monitor: Yes  IVF/IV medications: Not Applicable   Oxygen: Room Air  Bedside sitter: Not Applicable   Isolation: Not applicable

## 2025-04-12 NOTE — DISCHARGE INSTRUCTIONS
DIVISION OF CARDIAC SURGERY   DISCHARGE INSTRUCTIONS    Activity:    NO driving for 4 weeks after surgery. You may ride as a passenger.  NO lifting, pushing, or pulling more than 10 pounds for 6 weeks.  For the next 6 weeks, keep your elbows close to your body and move within a pain-free motion when lifting, pushing or pulling.  Do not stretch both arms backwards at the same time.    Walk at least 4 times per day, there is no maximum. The goal is to increase your distance over time.  Continue using incentive spirometer for 2 weeks or until your baseline volume is reached.  If you are going home on oxygen and you were not on oxygen prior to surgery, keep using until you are oxygen free.  Weigh yourself daily.  Call your Cardiologist for a weight gain of 3 or more pounds in 1 day or more than 5 pounds in 7 days.  Take all of your medications as prescribed. Do not use a pill box for the first month at home. If you have questions, please call your nurse navigator at [phone].  Continue to wear the DHRUV (compression) stockings for 2-4 weeks or until all swelling is gone. You may take them off when you are in bed or when your legs are elevated.    Incision Care:    Make sure to clean your incision(s) TWICE DAILY.  Once by showering AND once using the no rinse Foam cleanser provided in the hospital.  During the shower, cleanse the incision(s) with a perfume and dye free soap (Dial, Dove, Brazilian Spring)  Use gentle pressure and rub up and down over incision with your hands or a washcloth. Rinse off and pat incision(s) dry with clean towel.  Keep the incision open to air. No creams or lotions on your incision(s). No baths.  If there is any increased redness or swelling, separation of the incision line, or thick drainage from any of your incisions, call the Cardiac Surgeons ([phone]).      General Instructions:    You have been referred to Cardiac Rehab.  You can start Cardiac Rehab 30 days after surgery.  If you do  not have an appointment at the time of discharge call [phone] to schedule an appointment.  Your Primary Care Doctor typically handles home oxygen. Oxygen may be stopped when your oxygen level is consistently greater than 90.  Check with your Primary Care Doctor if you are unsure.  Take all of your medications (including pain medications) as prescribed.  Taking medications other than prescribed can result in serious injury.    For Patients Discharged with Narcotic Pain Medication:     If a refill is needed, understand that only 1 refill will be provided and you must come to the Cardiac Surgeons’ office for an appointment (72 hours’ notice is required to schedule and there are no weekend appointments).  If the pain medications you are discharged on are not working, you will need to bring your remaining prescription into the office in order to receive a new prescription.  If you were taking narcotics prior to your heart surgery, the Cardiac Surgeons will provide you with one prescription and additional medications will need to be provided by your pain management doctor.  Do not drink alcohol while taking narcotics.  Lost or stolen medications will not be refilled.  If medications are stolen, report to law enforcement.    Contact Cardiac Surgery at [phone] if you have any questions.

## 2025-04-14 LAB — LV EJECT FRACT  99904: 55

## 2025-04-16 ENCOUNTER — TELEPHONE (OUTPATIENT)
Facility: MEDICAL CENTER | Age: 65
End: 2025-04-16
Payer: COMMERCIAL

## 2025-04-16 NOTE — TELEPHONE ENCOUNTER
Hospital follow up call    he states that the post op pain is well controlled except for the coughing fits.  Narcotics are being utilized. Tylenol is being utilized.    he is showering everyday or every other day.    he states that all incisions are healing well and approximated with no s/s of infection (redness, puss, fever, purulent drainage, or tenderness).    he is using the IS; volume is not improved. Current volume is 2250 ml, baseline 4250. Does c/o persistent SOB although not affecting activities. He was told to call by Monday if IS volume is not improved.    he is walking everyday, distance is increased from the hospital.    he has had a bowel movement since discharge.    he is obtaining daily weights. Current weight is 258 lbs which is less than the first home weight of 257.2 lbs. He is has not been wearing the compression/DHRUV hose. he denies LE edema.    he is taking all prescribed meds as directed.  he has medication questions. After discussing his BP's it turns out he has not taken his irbesartan for a few days.  He was told to restart now, and take a dose today. He was told to call me if he is still in the 140's on Friday.    he is taking vitals (HR and BP).    BP's: 130's to 140 / 80's  HR's: 90's    he has no additional questions at this time. Follow up education was not needed on anything else. Follow up appointments were reviewed and I ensured they have my number if issues or concerns arise.      Follow up call time was 14 minutes.

## 2025-04-28 NOTE — PROGRESS NOTES
"CHIEF COMPLAINT: Post-op visit     PROCEDURE:   Dr. Buckley 4/8/2025:  CORONARY BYPASS GRAFTING X1 WITH SKELENTONIZED LIMA TO LAD- Wound Class: Clean with Drain  ECHOCARDIOGRAM, TRANSESOPHAGEAL, INTRAOPERATIVE - Wound Class: Clean Contaminated    HPI: Patient presents to post op clinic for one month follow up. He reports some fatigue and soreness in his chest. He denies popping and clicking. He is walking about  mile 3 days per week. He plans to start cardiac rehab next month.       BP 98/62 (BP Location: Left arm, Patient Position: Sitting, BP Cuff Size: Adult)   Pulse 95   Temp 36.8 °C (98.2 °F) (Temporal)   Ht 1.854 m (6' 1\")   Wt 118 kg (261 lb)   SpO2 99%     PHYSICAL EXAM:  Physical Exam   Cardiac: S1S2  Neuro:  AAO x 3  Resp:  CTA  Wounds:  CDI  Sternum:  Stable      PLAN: Stop lasix and potassium. Discussed hydration. Patient to continue checking blood pressure at home. Discharge from cardiac surgery clinic.    Continue plavix for 3 months or per your Cardiologist's recommendations.  We reviewed post operative sternal precautions, weight limits and driving precautions moving forward.  We reviewed SBE prophylaxis.      Overall, we are very pleased with the patient’s progress and we have instructed the patient to follow-up with us in the future should they have any concerns related to their surgery. Otherwise, we will see the patient on a PRN basis. The patient will continue to follow-up with their Cardiologist and PCP.  The patient has been informed that any further prescription refills should be done through their primary care physician and/or cardiologist.  They acknowledged understanding.  Thank you for allowing us to participate in the care of this very pleasant patient and please let us know if there is any way we may be of further assistance.    "

## 2025-05-12 ENCOUNTER — OFFICE VISIT (OUTPATIENT)
Facility: MEDICAL CENTER | Age: 65
End: 2025-05-12
Payer: COMMERCIAL

## 2025-05-12 VITALS
BODY MASS INDEX: 34.59 KG/M2 | HEIGHT: 73 IN | WEIGHT: 261 LBS | SYSTOLIC BLOOD PRESSURE: 98 MMHG | HEART RATE: 95 BPM | TEMPERATURE: 98.2 F | OXYGEN SATURATION: 99 % | DIASTOLIC BLOOD PRESSURE: 62 MMHG

## 2025-05-12 DIAGNOSIS — Z95.1 S/P CABG X 1: ICD-10-CM

## 2025-05-12 PROCEDURE — 3078F DIAST BP <80 MM HG: CPT | Performed by: NURSE PRACTITIONER

## 2025-05-12 PROCEDURE — 3074F SYST BP LT 130 MM HG: CPT | Performed by: NURSE PRACTITIONER

## 2025-05-12 PROCEDURE — 99024 POSTOP FOLLOW-UP VISIT: CPT | Performed by: NURSE PRACTITIONER

## 2025-05-12 ASSESSMENT — FIBROSIS 4 INDEX: FIB4 SCORE: 1.31

## 2025-05-17 LAB — COMPONENT P 8504P: NORMAL

## 2025-06-09 ENCOUNTER — NON-PROVIDER VISIT (OUTPATIENT)
Dept: CARDIOLOGY | Facility: MEDICAL CENTER | Age: 65
End: 2025-06-09
Payer: COMMERCIAL

## 2025-06-09 DIAGNOSIS — Z95.1 S/P CABG (CORONARY ARTERY BYPASS GRAFT): Primary | ICD-10-CM

## 2025-06-09 PROCEDURE — G0422 INTENS CARDIAC REHAB W/EXERC: HCPCS | Performed by: INTERNAL MEDICINE

## 2025-06-09 PROCEDURE — G0423 INTENS CARDIAC REHAB NO EXER: HCPCS | Mod: 59 | Performed by: INTERNAL MEDICINE

## 2025-06-09 NOTE — PROGRESS NOTES
Patient arrived for initial 1:1 Intensive Cardiac Rehabilitation Consultation and Education session with the Registered Nurse.  A total of 60 minutes was spent with the patient during which time a focused cardiovascular assessment was completed and musculoskeletal limitations were addressed in preparation to safely start the exercise portion of the program.  Education regarding the program was explained including exercise goals, precautions, and target heart rate during exercise.  Nutrition goals were reviewed and patient was introduced to the [facility] Program.  Stress management goals were discussed and patient concerns and questions were answered at this time. Patient arrived for education at 1330 and visit was concluded at 1430.  Exercise time was from 1430 to 1530.

## 2025-06-18 ENCOUNTER — NON-PROVIDER VISIT (OUTPATIENT)
Dept: CARDIOLOGY | Facility: MEDICAL CENTER | Age: 65
End: 2025-06-18
Payer: COMMERCIAL

## 2025-06-18 DIAGNOSIS — Z95.1 S/P CABG (CORONARY ARTERY BYPASS GRAFT): ICD-10-CM

## 2025-06-18 PROCEDURE — G0423 INTENS CARDIAC REHAB NO EXER: HCPCS | Mod: 59 | Performed by: INTERNAL MEDICINE

## 2025-06-18 PROCEDURE — G0422 INTENS CARDIAC REHAB W/EXERC: HCPCS | Performed by: INTERNAL MEDICINE

## 2025-06-18 NOTE — PROGRESS NOTES
Martir Hoyos Jr. attended Intensive Cardiac Rehab today from 0900 to 1100. During his time he exercised and attended class.   His education today was a cooking school titled: Fast Evening Meals. Patient received handouts and class discussion pertaining to the topic.

## 2025-06-19 ENCOUNTER — NON-PROVIDER VISIT (OUTPATIENT)
Dept: CARDIOLOGY | Facility: MEDICAL CENTER | Age: 65
End: 2025-06-19
Payer: COMMERCIAL

## 2025-06-19 DIAGNOSIS — Z95.1 S/P CABG (CORONARY ARTERY BYPASS GRAFT): ICD-10-CM

## 2025-06-19 PROCEDURE — G0422 INTENS CARDIAC REHAB W/EXERC: HCPCS | Performed by: INTERNAL MEDICINE

## 2025-06-19 PROCEDURE — G0423 INTENS CARDIAC REHAB NO EXER: HCPCS | Mod: 59 | Performed by: INTERNAL MEDICINE

## 2025-06-20 NOTE — PROGRESS NOTES
Martir Hoyos Jr. attended Intensive Cardiac Rehab today from 1600 to 1800. During his time he exercised and attended class.   His education today was a workshop titled: [employer]. Patient received handouts and class discussion pertaining to the topic.

## 2025-06-24 ENCOUNTER — NON-PROVIDER VISIT (OUTPATIENT)
Dept: CARDIOLOGY | Facility: MEDICAL CENTER | Age: 65
End: 2025-06-24
Payer: COMMERCIAL

## 2025-06-24 DIAGNOSIS — Z95.1 S/P CABG (CORONARY ARTERY BYPASS GRAFT): ICD-10-CM

## 2025-06-24 PROCEDURE — G0423 INTENS CARDIAC REHAB NO EXER: HCPCS | Mod: 59 | Performed by: INTERNAL MEDICINE

## 2025-06-24 PROCEDURE — G0422 INTENS CARDIAC REHAB W/EXERC: HCPCS | Performed by: INTERNAL MEDICINE

## 2025-06-25 ENCOUNTER — NON-PROVIDER VISIT (OUTPATIENT)
Dept: CARDIOLOGY | Facility: MEDICAL CENTER | Age: 65
End: 2025-06-25
Payer: COMMERCIAL

## 2025-06-25 DIAGNOSIS — Z95.1 S/P CABG (CORONARY ARTERY BYPASS GRAFT): ICD-10-CM

## 2025-06-25 PROCEDURE — G0422 INTENS CARDIAC REHAB W/EXERC: HCPCS | Performed by: INTERNAL MEDICINE

## 2025-06-25 PROCEDURE — G0423 INTENS CARDIAC REHAB NO EXER: HCPCS | Mod: 59 | Performed by: INTERNAL MEDICINE

## 2025-06-25 NOTE — PROGRESS NOTES
Martir Hoyos Jr. attended Intensive Cardiac Rehab today from 1600 to 1800. During his time he exercised and attended class.   His education today was a video titled: Heart Disease Risk Reduction. Patient received handouts and class discussion pertaining to the topic.

## 2025-06-26 ENCOUNTER — NON-PROVIDER VISIT (OUTPATIENT)
Dept: CARDIOLOGY | Facility: MEDICAL CENTER | Age: 65
End: 2025-06-26
Payer: COMMERCIAL

## 2025-06-26 DIAGNOSIS — Z95.1 S/P CABG (CORONARY ARTERY BYPASS GRAFT): ICD-10-CM

## 2025-06-26 PROCEDURE — G0423 INTENS CARDIAC REHAB NO EXER: HCPCS | Mod: 59 | Performed by: INTERNAL MEDICINE

## 2025-06-26 PROCEDURE — G0422 INTENS CARDIAC REHAB W/EXERC: HCPCS | Performed by: INTERNAL MEDICINE

## 2025-06-26 NOTE — PROGRESS NOTES
Martir Hoyos Jr. attended Intensive Cardiac Rehab today from 1600 to 1800. During his time he exercised and attended class.   His education today was a cooking school titled: [employer]. Patient received handouts and class discussion pertaining to the topic.

## 2025-06-27 NOTE — PROGRESS NOTES
Martir Hoyos Jr. attended Intensive Cardiac Rehab today from 1600 to 1800. During his time he exercised and attended class.   His education today was a workshop titled: Mindfulness For Heart Health. Patient received handouts and class discussion pertaining to the topic.

## 2025-07-01 ENCOUNTER — APPOINTMENT (OUTPATIENT)
Dept: CARDIOLOGY | Facility: MEDICAL CENTER | Age: 65
End: 2025-07-01
Payer: COMMERCIAL

## 2025-07-01 DIAGNOSIS — Z95.1 S/P CABG (CORONARY ARTERY BYPASS GRAFT): ICD-10-CM

## 2025-07-01 PROCEDURE — G0423 INTENS CARDIAC REHAB NO EXER: HCPCS | Mod: 59 | Performed by: INTERNAL MEDICINE

## 2025-07-01 PROCEDURE — G0422 INTENS CARDIAC REHAB W/EXERC: HCPCS | Performed by: INTERNAL MEDICINE

## 2025-07-01 NOTE — PROGRESS NOTES
Martir Hoyos Jr. attended Intensive Cardiac Rehab today from 1600 to 1800. During his time he exercised and attended a one on one with the RD.   His education today was a one on one with the RD. Patient received handouts and class discussion pertaining to the topic.

## 2025-07-02 ENCOUNTER — APPOINTMENT (OUTPATIENT)
Dept: CARDIOLOGY | Facility: MEDICAL CENTER | Age: 65
End: 2025-07-02
Payer: COMMERCIAL

## 2025-07-02 DIAGNOSIS — Z95.1 S/P CABG (CORONARY ARTERY BYPASS GRAFT): ICD-10-CM

## 2025-07-02 PROCEDURE — G0423 INTENS CARDIAC REHAB NO EXER: HCPCS | Mod: 59 | Performed by: INTERNAL MEDICINE

## 2025-07-02 PROCEDURE — G0422 INTENS CARDIAC REHAB W/EXERC: HCPCS | Performed by: INTERNAL MEDICINE

## 2025-07-02 NOTE — PROGRESS NOTES
Nutrition Consult Note by:    ray ferguson  Patient name: Martir Hoyos Jr.  Date of visit:  7/1/25     Start Time: 4:00 End Time: 5:00  Referring MD: Eris      Referring diagnosis: CABG          Diagnosis code:     Martir Hoyos Jr. is here today for Intensive Cardiac Rehab. Today the patient had their one-on-one RD appointment. This program includes Nutrition education and counseling following the Pritikin guidelines, which promote whole foods-fruits, vegetables, beans/legumes, whole grains, reduced saturated fats with lean animal protein and reduced fat dairy-while avoiding added salt, processed foods with excessive added sugars/salt/fat, trans/saturated fat, added sugar, tropical oils and heavy use of added oils.     Past Medical History:   CAD, htn, obesity class 2    Nutrition-related Medications/Supplements:  Metoprolol, rosuvastatin    NUTRITION ASSESSMENT    Anthropometrics:  Height: 1.85m     Weight: 122k       BMI: 35.6               Goal weight: -12k to start    Protein requirement range: 120-160g/day (advised pt engage in strength training)        Nutrition-Related Labs:  Date: 7/1/25           BP: 124/82 Heart Rate: 81  Date: 4/12/25  Glucose: 118         HbA1c: 5.6  Lipids: N/A  Other labs:                  Digestive s/s:    none reported    Social History:    Current tobacco use (Y/N): never    ETOH (Y/N): yes        If yes, frequency & variety: unclear, but 3-4 drinks when does drink   Primary Food  and/or preparer in the household: wife, self   Level of social/familial support? good    Diet Recall and Relevant Hx:  6/29/25  Breakfast: none    Lunch: 12 wheat thins, 12 squares American and [employer] cheese    Dinner: 4 slices beef brisket, 3-4 pork ribs, 1 C homemade potato salad, 1/2 C humaira slaw    Snacks: none reported    Beverages: water     Diet change recommendations/Pt goals:   Pt wishes to lose weight - at highest weight he's ever been at. Has done diet programs in past  and lost weight.  I explained how weight loss works, and the challenges involved. I gave pt a 2200 Avinash meal plan as well as an 1800 Avinash meal plan and suggested he keep his expectations for weight loss modest, and also showed how to adjust the meal plan if he isn't getting results.    I also rec'd that he check in with his regular dr to see if he could get Zepbound covered - people are generally more successful with substantial weight loss using these medications. Ideally, also get a referral to an RD to help with the meal plan and to transition off the meds if you lose access to them.    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     Nutrition SMART Goals:  1. Water intake Recommended: >/=64 oz per day  2. Whole Grains/beans/legumes/seeds/starchy vegetables at 1/2 cup servings 5x day   3. Vegetables 1/2 C cooked or 1 C raw servings  4+ per day  4. Fruits: 2/3 C servings  2-3 x per day      Educational Handouts Provide  20 ways to add F&V  Dash product list  Healthy swaps  Protein content of foods  Calorie deficit meal plan    N/A Diabetes related education     Nutrition Diagnosis:   Problem:  Nutrition-related knowledge deficit  Unsupported beliefs/attitudes about food or nutrition-related topics  Limited access to nutrition-related supplies  Self-monitoring deficit  Excessive sodium intake  Limited adherence to nutrition-related recommendations  Undesirable food choices  Physical inactivity  Altered nutrition-related  lab values  Excessive fat intake  Not ready for diet and Lifestyle change  Excessive Sodium Intake  Excessive Energy Intake  Excessive Fat intake  At risk for weight loss and malnutrition related to frequent COPD exacerbations as evidenced by PO intake <50% of estimated nutrient needs  At risk for weight loss   Fluctuating blood sugars related to excessive carbohydrate intake as evidenced by inconsistent timing of meals and increased snacking    Related to: food choices and amounts     As Evidenced by: pt diet diary, report    Follow Up PRN

## 2025-07-03 ENCOUNTER — APPOINTMENT (OUTPATIENT)
Dept: CARDIOLOGY | Facility: MEDICAL CENTER | Age: 65
End: 2025-07-03
Payer: COMMERCIAL

## 2025-07-03 NOTE — PROGRESS NOTES
Martir Hoyos Jr. attended Intensive Cardiac Rehab today from 1600 to 1800. During his time he exercised and attended class.   His education today was a nutrition class titled: Personalize Your Pritikin Plate. Patient received handouts and class discussion pertaining to the topic.

## 2025-07-08 ENCOUNTER — APPOINTMENT (OUTPATIENT)
Dept: CARDIOLOGY | Facility: MEDICAL CENTER | Age: 65
End: 2025-07-08
Payer: COMMERCIAL

## 2025-07-08 DIAGNOSIS — Z95.1 S/P CABG (CORONARY ARTERY BYPASS GRAFT): ICD-10-CM

## 2025-07-08 PROCEDURE — G0422 INTENS CARDIAC REHAB W/EXERC: HCPCS | Mod: 59 | Performed by: INTERNAL MEDICINE

## 2025-07-08 PROCEDURE — G0423 INTENS CARDIAC REHAB NO EXER: HCPCS | Performed by: INTERNAL MEDICINE

## 2025-07-08 NOTE — PROGRESS NOTES
Martir Hoyos Jr. attended Intensive Cardiac Rehab today from 1600 to 1800. During his time he exercised and attended class.   His education today was a VIDEO titled: BODY COMPOSITION. Patient received handouts and class discussion pertaining to the topic.

## 2025-07-09 ENCOUNTER — APPOINTMENT (OUTPATIENT)
Dept: CARDIOLOGY | Facility: MEDICAL CENTER | Age: 65
End: 2025-07-09
Payer: COMMERCIAL

## 2025-07-09 DIAGNOSIS — Z95.1 S/P CABG (CORONARY ARTERY BYPASS GRAFT): ICD-10-CM

## 2025-07-09 PROCEDURE — G0422 INTENS CARDIAC REHAB W/EXERC: HCPCS | Performed by: INTERNAL MEDICINE

## 2025-07-09 PROCEDURE — G0423 INTENS CARDIAC REHAB NO EXER: HCPCS | Mod: 59 | Performed by: INTERNAL MEDICINE

## 2025-07-09 NOTE — PROGRESS NOTES
Martir Hoyos Jr. attended Intensive Cardiac Rehab today from 1600 to 1800. During his time he exercised and attended class.   His education today was a cooking school titled: Adding Flavor Sodium Free. Patient received handouts and class discussion pertaining to the topic.

## 2025-07-10 ENCOUNTER — APPOINTMENT (OUTPATIENT)
Dept: CARDIOLOGY | Facility: MEDICAL CENTER | Age: 65
End: 2025-07-10
Payer: COMMERCIAL

## 2025-07-10 DIAGNOSIS — Z95.1 S/P CABG (CORONARY ARTERY BYPASS GRAFT): ICD-10-CM

## 2025-07-10 PROCEDURE — G0422 INTENS CARDIAC REHAB W/EXERC: HCPCS | Performed by: INTERNAL MEDICINE

## 2025-07-10 PROCEDURE — G0423 INTENS CARDIAC REHAB NO EXER: HCPCS | Mod: 59 | Performed by: INTERNAL MEDICINE

## 2025-07-10 NOTE — PROGRESS NOTES
Martir Hoyos Jr. attended Intensive Cardiac Rehab today from 1600 to 1800. During his time he exercised and attended class.   His education today was a workshop titled: Exercise Basics. Patient received handouts and class discussion pertaining to the topic.

## 2025-07-15 ENCOUNTER — APPOINTMENT (OUTPATIENT)
Dept: CARDIOLOGY | Facility: MEDICAL CENTER | Age: 65
End: 2025-07-15
Payer: COMMERCIAL

## 2025-07-15 DIAGNOSIS — Z95.1 S/P CABG (CORONARY ARTERY BYPASS GRAFT): ICD-10-CM

## 2025-07-15 PROCEDURE — G0422 INTENS CARDIAC REHAB W/EXERC: HCPCS | Performed by: INTERNAL MEDICINE

## 2025-07-15 PROCEDURE — G0423 INTENS CARDIAC REHAB NO EXER: HCPCS | Mod: 59 | Performed by: INTERNAL MEDICINE

## 2025-07-16 ENCOUNTER — APPOINTMENT (OUTPATIENT)
Dept: CARDIOLOGY | Facility: MEDICAL CENTER | Age: 65
End: 2025-07-16
Payer: COMMERCIAL

## 2025-07-16 DIAGNOSIS — Z95.1 S/P CABG (CORONARY ARTERY BYPASS GRAFT): ICD-10-CM

## 2025-07-16 PROCEDURE — G0423 INTENS CARDIAC REHAB NO EXER: HCPCS | Mod: 59 | Performed by: INTERNAL MEDICINE

## 2025-07-16 PROCEDURE — G0422 INTENS CARDIAC REHAB W/EXERC: HCPCS | Performed by: INTERNAL MEDICINE

## 2025-07-16 NOTE — PROGRESS NOTES
Martir Hoyos Jr. attended Intensive Cardiac Rehab today from 1600 to 1800. During his time he exercised and attended class.   His education today was a workshop titled: Fast and Healthy Breakfast. Patient received handouts and class discussion pertaining to the topic.

## 2025-07-16 NOTE — PROGRESS NOTES
Martir Hoyos Jr. attended Intensive Cardiac Rehab today from 1600 to 1800. During his time he exercised and attended class.   His education today was a VIDEO titled: LABEL READING. Patient received handouts and class discussion pertaining to the topic.

## 2025-07-17 ENCOUNTER — APPOINTMENT (OUTPATIENT)
Dept: CARDIOLOGY | Facility: MEDICAL CENTER | Age: 65
End: 2025-07-17
Payer: COMMERCIAL

## 2025-07-17 DIAGNOSIS — Z95.1 S/P CABG (CORONARY ARTERY BYPASS GRAFT): ICD-10-CM

## 2025-07-17 PROCEDURE — G0422 INTENS CARDIAC REHAB W/EXERC: HCPCS | Performed by: INTERNAL MEDICINE

## 2025-07-17 PROCEDURE — G0423 INTENS CARDIAC REHAB NO EXER: HCPCS | Mod: 59 | Performed by: INTERNAL MEDICINE

## 2025-07-17 NOTE — PROGRESS NOTES
Martir Hoyos Jr. attended Intensive Cardiac Rehab today from 1600 to 1800. During his time he exercised and attended class.   His education today was a workshop titled: Focused Goals and Sustainable Change. Patient received handouts and class discussion pertaining to the topic.

## 2025-07-22 ENCOUNTER — APPOINTMENT (OUTPATIENT)
Dept: CARDIOLOGY | Facility: MEDICAL CENTER | Age: 65
End: 2025-07-22
Payer: COMMERCIAL

## 2025-07-22 DIAGNOSIS — Z95.1 S/P CABG (CORONARY ARTERY BYPASS GRAFT): ICD-10-CM

## 2025-07-22 PROCEDURE — 93797 PHYS/QHP OP CAR RHAB WO ECG: CPT | Performed by: INTERNAL MEDICINE

## 2025-07-23 ENCOUNTER — APPOINTMENT (OUTPATIENT)
Dept: CARDIOLOGY | Facility: MEDICAL CENTER | Age: 65
End: 2025-07-23
Payer: COMMERCIAL

## 2025-07-23 DIAGNOSIS — Z95.1 S/P CABG (CORONARY ARTERY BYPASS GRAFT): ICD-10-CM

## 2025-07-23 PROCEDURE — 93797 PHYS/QHP OP CAR RHAB WO ECG: CPT | Performed by: INTERNAL MEDICINE

## 2025-07-23 NOTE — PROGRESS NOTES
Martir Hoyos Jr. attended Intensive Cardiac Rehab today from 1600 to 1800. During his time he exercised and attended class.   His education today was a video titled: Exercise Action Plan. Patient received handouts and class discussion pertaining to the topic.

## 2025-07-24 ENCOUNTER — APPOINTMENT (OUTPATIENT)
Dept: CARDIOLOGY | Facility: MEDICAL CENTER | Age: 65
End: 2025-07-24
Payer: COMMERCIAL

## 2025-07-24 DIAGNOSIS — Z95.1 S/P CABG (CORONARY ARTERY BYPASS GRAFT): ICD-10-CM

## 2025-07-24 PROCEDURE — 93797 PHYS/QHP OP CAR RHAB WO ECG: CPT | Performed by: INTERNAL MEDICINE

## 2025-07-24 NOTE — PROGRESS NOTES
Martir Hoyos Jr. attended Intensive Cardiac Rehab today from 1600 to 1800. During his time he exercised and attended class.   His education today was a cooking school titled: [employer] Plant Proteins. Patient received handouts and class discussion pertaining to the topic.

## 2025-07-28 NOTE — PROGRESS NOTES
Martir Hoyos Jr. attended Intensive Cardiac Rehab today from 1600 to 1800. During his time he exercised and attended class.   His education today was a PRITIKIN WORKSHOP titled: FUELING A HEALTHY BODY.Patient received handouts and class discussion pertaining to the topic.

## 2025-07-29 ENCOUNTER — APPOINTMENT (OUTPATIENT)
Dept: CARDIOLOGY | Facility: MEDICAL CENTER | Age: 65
End: 2025-07-29
Payer: COMMERCIAL

## 2025-07-29 DIAGNOSIS — Z95.1 S/P CABG (CORONARY ARTERY BYPASS GRAFT): ICD-10-CM

## 2025-07-29 NOTE — PROGRESS NOTES
Martir Hoyos Jr. attended Intensive Cardiac Rehab today from 1600 to 1800. During his time he exercised and attended class.  His education today was a Video titled Biomechanical Limitations. Education included handouts, discussion, and questions and answers.

## 2025-07-30 ENCOUNTER — APPOINTMENT (OUTPATIENT)
Dept: CARDIOLOGY | Facility: MEDICAL CENTER | Age: 65
End: 2025-07-30
Payer: COMMERCIAL

## 2025-07-30 ENCOUNTER — NON-PROVIDER VISIT (OUTPATIENT)
Dept: CARDIOLOGY | Facility: MEDICAL CENTER | Age: 65
End: 2025-07-30
Payer: COMMERCIAL

## 2025-07-30 DIAGNOSIS — Z95.1 S/P CABG (CORONARY ARTERY BYPASS GRAFT): ICD-10-CM

## 2025-07-31 ENCOUNTER — APPOINTMENT (OUTPATIENT)
Dept: CARDIOLOGY | Facility: MEDICAL CENTER | Age: 65
End: 2025-07-31
Payer: COMMERCIAL

## 2025-07-31 ENCOUNTER — NON-PROVIDER VISIT (OUTPATIENT)
Dept: CARDIOLOGY | Facility: MEDICAL CENTER | Age: 65
End: 2025-07-31
Payer: COMMERCIAL

## 2025-07-31 DIAGNOSIS — Z95.1 S/P CABG (CORONARY ARTERY BYPASS GRAFT): ICD-10-CM

## 2025-07-31 NOTE — PROGRESS NOTES
Martir Hoyos Jr. attended Intensive Cardiac Rehab today from 1600 to 1800. During his time he exercised and attended class.  His education today was a Workshop titled Exercise Biomechanics. Education included handouts, discussion, and questions and answers.

## 2025-07-31 NOTE — PROGRESS NOTES
Martir Hoyos Jr. attended Intensive Cardiac Rehab today from 1600 to 1800. During his time he exercised and attended class.   His education today was a cooking school titled: Satisfying Salads and Dressings. Patient received handouts and class discussion pertaining to the topic.

## 2025-08-05 ENCOUNTER — APPOINTMENT (OUTPATIENT)
Dept: CARDIOLOGY | Facility: MEDICAL CENTER | Age: 65
End: 2025-08-05
Payer: COMMERCIAL

## 2025-08-05 ENCOUNTER — NON-PROVIDER VISIT (OUTPATIENT)
Dept: CARDIOLOGY | Facility: MEDICAL CENTER | Age: 65
End: 2025-08-05
Payer: COMMERCIAL

## 2025-08-05 DIAGNOSIS — Z95.1 S/P CABG (CORONARY ARTERY BYPASS GRAFT): ICD-10-CM

## 2025-08-05 PROCEDURE — G0423 INTENS CARDIAC REHAB NO EXER: HCPCS | Mod: 59 | Performed by: INTERNAL MEDICINE

## 2025-08-05 PROCEDURE — G0422 INTENS CARDIAC REHAB W/EXERC: HCPCS | Performed by: INTERNAL MEDICINE

## 2025-08-06 ENCOUNTER — NON-PROVIDER VISIT (OUTPATIENT)
Dept: CARDIOLOGY | Facility: MEDICAL CENTER | Age: 65
End: 2025-08-06
Payer: COMMERCIAL

## 2025-08-06 ENCOUNTER — APPOINTMENT (OUTPATIENT)
Dept: CARDIOLOGY | Facility: MEDICAL CENTER | Age: 65
End: 2025-08-06
Payer: COMMERCIAL

## 2025-08-06 DIAGNOSIS — Z95.1 S/P CABG (CORONARY ARTERY BYPASS GRAFT): ICD-10-CM

## 2025-08-06 PROCEDURE — 93797 PHYS/QHP OP CAR RHAB WO ECG: CPT | Performed by: INTERNAL MEDICINE

## 2025-08-07 ENCOUNTER — APPOINTMENT (OUTPATIENT)
Dept: CARDIOLOGY | Facility: MEDICAL CENTER | Age: 65
End: 2025-08-07
Payer: COMMERCIAL

## 2025-08-12 ENCOUNTER — NON-PROVIDER VISIT (OUTPATIENT)
Dept: CARDIOLOGY | Facility: MEDICAL CENTER | Age: 65
End: 2025-08-12
Payer: COMMERCIAL

## 2025-08-12 ENCOUNTER — APPOINTMENT (OUTPATIENT)
Dept: CARDIOLOGY | Facility: MEDICAL CENTER | Age: 65
End: 2025-08-12
Payer: COMMERCIAL

## 2025-08-12 DIAGNOSIS — Z95.1 S/P CABG (CORONARY ARTERY BYPASS GRAFT): ICD-10-CM

## 2025-08-12 PROCEDURE — 93797 PHYS/QHP OP CAR RHAB WO ECG: CPT | Performed by: INTERNAL MEDICINE

## 2025-08-13 ENCOUNTER — NON-PROVIDER VISIT (OUTPATIENT)
Dept: CARDIOLOGY | Facility: MEDICAL CENTER | Age: 65
End: 2025-08-13
Payer: COMMERCIAL

## 2025-08-13 ENCOUNTER — APPOINTMENT (OUTPATIENT)
Dept: CARDIOLOGY | Facility: MEDICAL CENTER | Age: 65
End: 2025-08-13
Payer: COMMERCIAL

## 2025-08-13 DIAGNOSIS — Z95.1 S/P CABG (CORONARY ARTERY BYPASS GRAFT): ICD-10-CM

## 2025-08-13 PROCEDURE — 93797 PHYS/QHP OP CAR RHAB WO ECG: CPT | Performed by: INTERNAL MEDICINE

## 2025-08-14 ENCOUNTER — APPOINTMENT (OUTPATIENT)
Dept: CARDIOLOGY | Facility: MEDICAL CENTER | Age: 65
End: 2025-08-14
Payer: COMMERCIAL

## 2025-08-14 ENCOUNTER — NON-PROVIDER VISIT (OUTPATIENT)
Dept: CARDIOLOGY | Facility: MEDICAL CENTER | Age: 65
End: 2025-08-14
Payer: COMMERCIAL

## 2025-08-14 DIAGNOSIS — Z95.1 S/P CABG (CORONARY ARTERY BYPASS GRAFT): ICD-10-CM

## 2025-08-14 PROCEDURE — 93797 PHYS/QHP OP CAR RHAB WO ECG: CPT | Performed by: INTERNAL MEDICINE

## 2025-08-19 ENCOUNTER — APPOINTMENT (OUTPATIENT)
Dept: CARDIOLOGY | Facility: MEDICAL CENTER | Age: 65
End: 2025-08-19
Payer: COMMERCIAL

## 2025-08-19 ENCOUNTER — NON-PROVIDER VISIT (OUTPATIENT)
Dept: CARDIOLOGY | Facility: MEDICAL CENTER | Age: 65
End: 2025-08-19
Payer: COMMERCIAL

## 2025-08-19 DIAGNOSIS — Z95.1 S/P CABG (CORONARY ARTERY BYPASS GRAFT): ICD-10-CM

## 2025-08-19 PROCEDURE — 93797 PHYS/QHP OP CAR RHAB WO ECG: CPT | Performed by: INTERNAL MEDICINE

## 2025-08-20 ENCOUNTER — APPOINTMENT (OUTPATIENT)
Dept: CARDIOLOGY | Facility: MEDICAL CENTER | Age: 65
End: 2025-08-20
Payer: COMMERCIAL

## 2025-08-20 ENCOUNTER — NON-PROVIDER VISIT (OUTPATIENT)
Dept: CARDIOLOGY | Facility: MEDICAL CENTER | Age: 65
End: 2025-08-20
Payer: COMMERCIAL

## 2025-08-20 DIAGNOSIS — Z95.1 S/P CABG (CORONARY ARTERY BYPASS GRAFT): ICD-10-CM

## 2025-08-20 PROCEDURE — 93797 PHYS/QHP OP CAR RHAB WO ECG: CPT | Performed by: INTERNAL MEDICINE

## 2025-08-21 ENCOUNTER — APPOINTMENT (OUTPATIENT)
Dept: CARDIOLOGY | Facility: MEDICAL CENTER | Age: 65
End: 2025-08-21
Payer: COMMERCIAL

## 2025-08-21 ENCOUNTER — NON-PROVIDER VISIT (OUTPATIENT)
Dept: CARDIOLOGY | Facility: MEDICAL CENTER | Age: 65
End: 2025-08-21
Payer: COMMERCIAL

## 2025-08-21 DIAGNOSIS — Z95.1 S/P CABG (CORONARY ARTERY BYPASS GRAFT): ICD-10-CM

## 2025-08-21 PROCEDURE — G0423 INTENS CARDIAC REHAB NO EXER: HCPCS | Mod: 59 | Performed by: INTERNAL MEDICINE

## 2025-08-21 PROCEDURE — G0422 INTENS CARDIAC REHAB W/EXERC: HCPCS | Performed by: INTERNAL MEDICINE

## 2025-08-26 ENCOUNTER — NON-PROVIDER VISIT (OUTPATIENT)
Dept: CARDIOLOGY | Facility: MEDICAL CENTER | Age: 65
End: 2025-08-26
Payer: COMMERCIAL

## 2025-08-26 ENCOUNTER — APPOINTMENT (OUTPATIENT)
Dept: CARDIOLOGY | Facility: MEDICAL CENTER | Age: 65
End: 2025-08-26
Payer: COMMERCIAL

## 2025-08-26 DIAGNOSIS — Z95.1 S/P CABG (CORONARY ARTERY BYPASS GRAFT): ICD-10-CM

## 2025-08-27 ENCOUNTER — APPOINTMENT (OUTPATIENT)
Dept: CARDIOLOGY | Facility: MEDICAL CENTER | Age: 65
End: 2025-08-27
Payer: COMMERCIAL

## 2025-08-27 ENCOUNTER — NON-PROVIDER VISIT (OUTPATIENT)
Dept: CARDIOLOGY | Facility: MEDICAL CENTER | Age: 65
End: 2025-08-27
Payer: COMMERCIAL

## 2025-08-27 DIAGNOSIS — Z95.1 S/P CABG (CORONARY ARTERY BYPASS GRAFT): ICD-10-CM

## 2025-08-28 ENCOUNTER — APPOINTMENT (OUTPATIENT)
Dept: CARDIOLOGY | Facility: MEDICAL CENTER | Age: 65
End: 2025-08-28
Payer: COMMERCIAL

## 2025-08-28 ENCOUNTER — NON-PROVIDER VISIT (OUTPATIENT)
Dept: CARDIOLOGY | Facility: MEDICAL CENTER | Age: 65
End: 2025-08-28
Payer: COMMERCIAL

## 2025-08-28 DIAGNOSIS — Z95.1 S/P CABG (CORONARY ARTERY BYPASS GRAFT): ICD-10-CM

## 2025-09-02 ENCOUNTER — APPOINTMENT (OUTPATIENT)
Dept: CARDIOLOGY | Facility: MEDICAL CENTER | Age: 65
End: 2025-09-02
Payer: COMMERCIAL

## 2025-09-03 ENCOUNTER — APPOINTMENT (OUTPATIENT)
Dept: CARDIOLOGY | Facility: MEDICAL CENTER | Age: 65
End: 2025-09-03
Payer: COMMERCIAL

## 2025-09-04 ENCOUNTER — APPOINTMENT (OUTPATIENT)
Dept: CARDIOLOGY | Facility: MEDICAL CENTER | Age: 65
End: 2025-09-04
Payer: COMMERCIAL

## 2025-09-09 ENCOUNTER — APPOINTMENT (OUTPATIENT)
Dept: CARDIOLOGY | Facility: MEDICAL CENTER | Age: 65
End: 2025-09-09
Payer: COMMERCIAL

## 2025-09-10 ENCOUNTER — APPOINTMENT (OUTPATIENT)
Dept: CARDIOLOGY | Facility: MEDICAL CENTER | Age: 65
End: 2025-09-10
Payer: COMMERCIAL

## (undated) DEVICE — BLADE BEAVER 7513 EYE 15 DEG 3.0MM DEPTH S/S UBL (6/CA)

## (undated) DEVICE — STAPLER SKIN DISP - (6/BX 10BX/CA) VISISTAT

## (undated) DEVICE — SUTURE 7-0 PROLENE 24BV175-6 - EP8735H (36/BX)"

## (undated) DEVICE — GOWN SURGICAL XX-LARGE - (28EA/CA) SIRUS NON REINFORCED

## (undated) DEVICE — TRAY SURESTEP FOLEY TEMP SENSING 16FR SNAP SECURE(10EA/CA) ORDER #18764 FOR TEMP FOLEY ONLY

## (undated) DEVICE — SUTURE 4-0 PROLENE RB-1 D/A 36 (36PK/BX)"

## (undated) DEVICE — BLOWER/MISTER (5EA/PK)

## (undated) DEVICE — KIT TOURNIQUET DLP (40EA/PK)

## (undated) DEVICE — SET FLUID WARMING STANDARD FLOW - (10/CA)

## (undated) DEVICE — SOD. CHL. INJ. 0.9% 1000 ML - (14EA/CA 60CA/PF)

## (undated) DEVICE — SUTURE 4-0 30CM STRATAFIX SPIRAL PS-2 (12EA/BX)

## (undated) DEVICE — GLOVE SIZE 6.5 SURGEON ACCELERATOR FREE GREEN (50PR/BX)

## (undated) DEVICE — SPONGE XRAY 8X4 STERL. 12PL - (10EA/TY 80TY/CA)

## (undated) DEVICE — SUCTION INSTRUMENT YANKAUER BULBOUS TIP W/O VENT (50EA/CA)

## (undated) DEVICE — CHLORAPREP 26 ML APPLICATOR - ORANGE TINT(25/CA)

## (undated) DEVICE — MICRODRIP PRIMARY VENTED 60 (48EA/CA) THIS WAS PART #2C8428 WHICH WAS DISCONTINUED

## (undated) DEVICE — KIT RADIAL ARTERY 20GA W/MAX BARRIER AND BIOPATCH (5EA/CA) #10740 IS FOR THE SET RADIAL ARTERIAL

## (undated) DEVICE — SPRING BULLDOG 1/2 FORCE BLUE - (10/BX)

## (undated) DEVICE — D-5-W INJ. 500 ML - (24EA/CA)

## (undated) DEVICE — GLOVE BIOGEL SZ 8.5 SURGICAL PF LTX - (50PR/BX 4BX/CA)

## (undated) DEVICE — SENSOR OXIMETER ADULT SPO2 RD SET (20EA/BX)

## (undated) DEVICE — GLOVE BIOGEL PI INDICATOR SZ 6.5 SURGICAL PF LF - (50/BX 4BX/CA)

## (undated) DEVICE — TRAY MULTI-LUMEN 7FR PRESSURE W/MAX BARRIER AND BIOPATCH - (5/CA)

## (undated) DEVICE — SUTURE OHS

## (undated) DEVICE — BLADE STERNUM SAW SURGICAL 32.0 X 6.4 MM STERILE (1/EA)

## (undated) DEVICE — TUBE CHEST 32FR. RIGHT ANGLED (10EA/CA)

## (undated) DEVICE — SUTURE 3-0 SILK SH C/R 18 IN - (12/BX)

## (undated) DEVICE — PAD LAP STERILE 18 X 18 - (5/PK 40PK/CA)

## (undated) DEVICE — PACK CV DRAPING/BASIN 2PART - (1/CA)

## (undated) DEVICE — SYSTEM CHEST DRAIN ADULT/PEDS W/AUTO TRANSFUSION CAPABILITY SAHARA (6EA/CA)

## (undated) DEVICE — CANISTER SUCTION 3000ML MECHANICAL FILTER AUTO SHUTOFF MEDI-VAC NONSTERILE LF DISP (40EA/CA)

## (undated) DEVICE — BAG SPONGE COUNT 10.25 X 32 - BLUE (250/CA)

## (undated) DEVICE — SODIUM CHL IRRIGATION 0.9% 1000ML (12EA/CA)

## (undated) DEVICE — TUBING INSUFFLATION - (10/BX)

## (undated) DEVICE — STOPCOCK MALE 4-WAY - (50/CA)

## (undated) DEVICE — COVER LIGHT HANDLE ALC PLUS DISP (18EA/BX)

## (undated) DEVICE — GOWN SURGEONS X-LARGE - DISP. (30/CA)

## (undated) DEVICE — HEMOSTASIS ABSORBABLE 4 X 4 (10EA/CA)

## (undated) DEVICE — SUTURE 2-0 VICRYL PLUS CT-1 36 (36PK/BX)"

## (undated) DEVICE — GLOVE SZ 7.5 BIOGEL PI MICRO - PF LF (50PR/BX)

## (undated) DEVICE — DERMABOND ADVANCED - (12EA/BX)

## (undated) DEVICE — SET BIFURCATED BLOOD - (48EA/CS)

## (undated) DEVICE — SET LEADWIRE 5 LEAD BEDSIDE DISPOSABLE ECG (1SET OF 5/EA)

## (undated) DEVICE — INSERT STEALTH #5 - (10/BX)

## (undated) DEVICE — SENSOR OXIMETER EQUANOX ADVANCE LARGE DISPOSABLE 8004CA 7600 SYSTEM (MUST ORDER IN QTYS OF 20)

## (undated) DEVICE — DECANTER FLD BLS - (50/CA)

## (undated) DEVICE — FIBRILLAR SURGICEL 4X4 - 10/CA

## (undated) DEVICE — WAX BONE ALKYLENE OXIDE HEMOSTASIS OSTENE 3.5GM (10EA/CA)

## (undated) DEVICE — KIT INTROPERCUTANEOUS SHEATH - 8.5 FR W/MAX BARRIER AND BIOPATCH (5/CA)

## (undated) DEVICE — SODIUM CHL. INJ. 0.9% 500ML (24EA/CA 50CA/PF)

## (undated) DEVICE — COVER CAMERA LENS LIGHT HANDLE STUBBY DISPOSABLE (20EA/BX)

## (undated) DEVICE — SUTURE 3-0 PROLENE SH 36 (36PK/BX)"

## (undated) DEVICE — BLADE SURGICAL #15 - (50/BX 3BX/CA)

## (undated) DEVICE — BLADE BEAVER 6900 MINI SHARP ALL AROUND (20/CA)

## (undated) DEVICE — GLOVE BIOGEL INDICATOR SZ 9 SURGICAL PF LTX - (160/CA)

## (undated) DEVICE — CATHETER THERMALDILUTION SWAN - (5EA/CA)

## (undated) DEVICE — GLOVE SZ 6 BIOGEL PI MICRO - PF LF (50PR/BX 4BX/CA)

## (undated) DEVICE — KIT ENDOHARVEST SYSTEM 8 - MUST ORDER 5 AT A TIME

## (undated) DEVICE — SYRINGE SAFETY 3 ML 18 GA X 1 1/2 BLUNT LL (100/BX 8BX/CA)

## (undated) DEVICE — ELECTRODE DUAL RETURN W/ CORD - (50/PK)

## (undated) DEVICE — SUTURE 6-0 PROLENE RB-2 D/A 30 (36PK/BX)"

## (undated) DEVICE — Device

## (undated) DEVICE — GLOVE BIOGEL PI INDICATOR SZ 7.0 SURGICAL PF LF - (50/BX 4BX/CA)

## (undated) DEVICE — SET TUBING PNEUMOCLEAR HIGH FLOW SMOKE EVACUATION (10EA/BX)

## (undated) DEVICE — SET EXTENSION WITH 2 PORTS (48EA/CA) ***PART #2C8610 IS A SUBSTITUTE*****

## (undated) DEVICE — SYS DLV COST CLS RM TEMP - INJECTATE (CO-SET II) (10EA/CA)

## (undated) DEVICE — TUBING CLEARLINK DUO-VENT - C-FLO (48EA/CA)

## (undated) DEVICE — SYRINGE NON SAFETY 3 CC 21 GA X 1 1/2 IN (100/BX 8BX/CA)

## (undated) DEVICE — LACTATED RINGERS INJ 1000 ML - (14EA/CA 60CA/PF)

## (undated) DEVICE — SUTURE 4-0 PROLENE V-7 D/A (36PK/BX)

## (undated) DEVICE — TRANSDUCER BIFURCATED MONITORING KIT (10EA/CA)

## (undated) DEVICE — TOWELS CLOTH SURGICAL - (4/PK 20PK/CA)

## (undated) DEVICE — SUTURE 7 SURGICAL STEEL CCS CUTTING (12/BX)

## (undated) DEVICE — GLOVE SZ 6.5 BIOGEL PI MICRO - PF LF (50PR/BX)

## (undated) DEVICE — LEAD PACING TEMP MYO - (12/BX)

## (undated) DEVICE — SYRINGE 30 ML LS (56/BX)

## (undated) DEVICE — CORETEMP DRAPE FORM-FITTED EASY DROPANDGO DRAPE FOR USE ON THE CORETEMP FLUID MANAGEMENT 56IN X 56IN

## (undated) DEVICE — TUBE CHEST 32FR SOFT STRAIGHT (10EA/BX)

## (undated) DEVICE — BAG RESUSCITATION DISPOSABLE - WITH MASK (10 EA/CA)